# Patient Record
Sex: FEMALE | Race: WHITE
[De-identification: names, ages, dates, MRNs, and addresses within clinical notes are randomized per-mention and may not be internally consistent; named-entity substitution may affect disease eponyms.]

---

## 2019-11-13 ENCOUNTER — HOSPITAL ENCOUNTER (EMERGENCY)
Dept: HOSPITAL 65 - ER | Age: 64
LOS: 1 days | Discharge: HOME | End: 2019-11-14
Payer: COMMERCIAL

## 2019-11-13 VITALS
WEIGHT: 180 LBS | HEIGHT: 63 IN | SYSTOLIC BLOOD PRESSURE: 136 MMHG | BODY MASS INDEX: 31.89 KG/M2 | DIASTOLIC BLOOD PRESSURE: 73 MMHG

## 2019-11-13 VITALS — DIASTOLIC BLOOD PRESSURE: 67 MMHG | SYSTOLIC BLOOD PRESSURE: 134 MMHG

## 2019-11-13 DIAGNOSIS — Z79.899: ICD-10-CM

## 2019-11-13 DIAGNOSIS — E78.00: ICD-10-CM

## 2019-11-13 DIAGNOSIS — Z79.82: ICD-10-CM

## 2019-11-13 DIAGNOSIS — Z88.5: ICD-10-CM

## 2019-11-13 DIAGNOSIS — N39.0: ICD-10-CM

## 2019-11-13 DIAGNOSIS — I25.10: ICD-10-CM

## 2019-11-13 DIAGNOSIS — E11.65: Primary | ICD-10-CM

## 2019-11-13 DIAGNOSIS — Z88.6: ICD-10-CM

## 2019-11-13 DIAGNOSIS — E86.0: ICD-10-CM

## 2019-11-13 DIAGNOSIS — Z79.4: ICD-10-CM

## 2019-11-13 DIAGNOSIS — Z90.49: ICD-10-CM

## 2019-11-13 LAB
ALP INTEST CFR SERPL: 108 U/L (ref 50–136)
ALT SERPL-CCNC: 20 U/L (ref 12–78)
APPEARANCE UR: (no result)
AST SERPL-CCNC: 11 U/L (ref 0–35)
BASOPHILS # BLD AUTO: 0 10^3/UL (ref 0–0.1)
BASOPHILS NFR BLD AUTO: 0.5 % (ref 0–0.2)
BILIRUB UR STRIP.AUTO-MCNC: NEGATIVE MG/DL
CALCIUM SERPL-MCNC: 9.1 MG/DL (ref 8.4–10.5)
CO2 BLDA-SCNC: 20.1 MMOL/L (ref 20–32)
COLOR UR: YELLOW
EOSINOPHIL # BLD AUTO: 0.2 10^3/UL (ref 0–0.2)
EOSINOPHIL NFR BLD AUTO: 2.2 % (ref 0–5)
ERYTHROCYTE [DISTWIDTH] IN BLOOD BY AUTOMATED COUNT: 12.8 % (ref 11.5–14.5)
GLUCOSE PRE 100 G GLC PO SERPL-MCNC: 428 MG/DL (ref 70–110)
HGB BLD-MCNC: 12.8 G/DL (ref 12–15)
LYMPHOCYTES # BLD AUTO: 3.5 10^3/UL (ref 1–4.8)
LYMPHOCYTES NFR BLD AUTO: 42.4 % (ref 24–44)
MCH RBC QN AUTO: 28.6 PG (ref 26–34)
MCHC RBC AUTO-ENTMCNC: 34.8 G/DL (ref 33–37)
MCV RBC AUTO: 82.3 FL (ref 78–100)
MONOCYTES # BLD AUTO: 0.7 10^3/UL (ref 0.3–0.8)
MONOCYTES NFR BLD AUTO: 7.8 % (ref 5–12)
NEUTROPHILS # BLD AUTO: 3.9 10^3/UL (ref 1.8–7.7)
NEUTROPHILS NFR BLD AUTO: 46.9 % (ref 41–85)
PLATELET # BLD AUTO: 252 10^3/UL (ref 150–400)
PMV BLD AUTO: 9.4 FL (ref 7.8–11)
UROBILINOGEN UR QL STRIP.AUTO: NORMAL
WBC # BLD AUTO: 8.3 10^3/UL (ref 4.5–11)
YEAST URNS QL MICRO: (no result)

## 2019-11-13 PROCEDURE — 87086 URINE CULTURE/COLONY COUNT: CPT

## 2019-11-13 PROCEDURE — 96372 THER/PROPH/DIAG INJ SC/IM: CPT

## 2019-11-13 PROCEDURE — 82553 CREATINE MB FRACTION: CPT

## 2019-11-13 PROCEDURE — 82550 ASSAY OF CK (CPK): CPT

## 2019-11-13 PROCEDURE — 80053 COMPREHEN METABOLIC PANEL: CPT

## 2019-11-13 PROCEDURE — 84484 ASSAY OF TROPONIN QUANT: CPT

## 2019-11-13 PROCEDURE — 85025 COMPLETE CBC W/AUTO DIFF WBC: CPT

## 2019-11-13 PROCEDURE — 96375 TX/PRO/DX INJ NEW DRUG ADDON: CPT

## 2019-11-13 PROCEDURE — 87186 SC STD MICRODIL/AGAR DIL: CPT

## 2019-11-13 PROCEDURE — 36415 COLL VENOUS BLD VENIPUNCTURE: CPT

## 2019-11-13 PROCEDURE — 96365 THER/PROPH/DIAG IV INF INIT: CPT

## 2019-11-13 PROCEDURE — 96361 HYDRATE IV INFUSION ADD-ON: CPT

## 2019-11-13 PROCEDURE — 87077 CULTURE AEROBIC IDENTIFY: CPT

## 2019-11-13 PROCEDURE — 71046 X-RAY EXAM CHEST 2 VIEWS: CPT

## 2019-11-13 PROCEDURE — 99285 EMERGENCY DEPT VISIT HI MDM: CPT

## 2019-11-13 PROCEDURE — 93005 ELECTROCARDIOGRAM TRACING: CPT

## 2019-11-13 PROCEDURE — 82948 REAGENT STRIP/BLOOD GLUCOSE: CPT

## 2019-11-13 PROCEDURE — 83690 ASSAY OF LIPASE: CPT

## 2019-11-13 PROCEDURE — 81000 URINALYSIS NONAUTO W/SCOPE: CPT

## 2019-11-13 NOTE — NUR
ARRIVAL



PATIENT PRESENTS VIA AMBULANCE WITH COMPLAINTS OF GENERALIZED BODY ACHES, 
BILATERAL SHOULDER PAIN, SOB WHEN LYING DOWN AND DIARRHEA FOR THE PAST 2 DAYS. 
PATIENT ALSO REPORTS THAT SHE THINKS SHE HAS A UTI AND IS DEHYDRATED. PATIENT 
HAS A CALVILLO CATHETER IN PLACE THAT SHE HAD REPLACED ONE WEEK AGO. REPORTS SHE 
WAS RECENTLY DISCHARGED FROM THE NURSING HOME 10/30/19. EMS REPORT BS 456MG/DL. 
NO SIGNS OF DISTRESS NOTED. INDER LEBRON MD NOTIFIED.

## 2019-11-13 NOTE — ER.PDOC
General


Chief Complaint:  Requesting Medical Care


Stated Complaint:  GENERALIZED PAIN,HYPERGLYCEMIA


TRAVEL OUT OF US:  No


Time seen by MD:  22:50


Source:  patient, family, EMS


Exam Limitations:  no limitations





History of Present Illness


Initial Comments


63 Y/O F WITH HX TO ED VIA EMS WITH MULTIPLE COMPLAINTS, BLOOD SUGAR UP, POSS 

UTI WITH CHRONIC CALVILLO THAT WAS CHANGED OUT X 1 WEEK, UPPER BILAT SHOULDER PAIN 

WITH DEEP BREATH-- NOT LIKE PAST HEART PAIN AND DIARRHEA X 2 DAYS. NO CHEST 

PAIN, NO FEVER, NO AND PAIN, NO N/V, NO BLOOD IN STOOLS, NO COUGH, STATES FEELS 

DEHYDRATED, NO OTHER COMPLAINTS. PATIENT REQUESTS TORADOL--CAN TAKE, DOES NOT 

HAVE AN ALLERGY TO IBP.


Timing/Duration:  24 hours


Severity:  moderate


Associated Symptoms:  weakness


Allergies:  


Coded Allergies:  


     codeine (Verified  Allergy, Unknown, 8/6/16)


     ibuprofen (Verified  Allergy, Unknown, 12/9/16)


     morphine (Verified  Allergy, Unknown, 8/6/16)


     naproxen (Verified  Allergy, Unknown, 8/6/16)


Home Meds


Active Scripts


Nitroglycerin (NITROGLYCERIN) 0.4 Mg Tab.subl, 0.4 MG SL DAILY PRN for CHEST 

PAIN, #30


   Prov:REINALDO REEVES MD         8/31/16


Metoprolol Tartrate 25MG (LOPRESSER 25MG) 25 Mg Tablet, 50 MG PO DAILY for BLOOD

PRESSURE, #30 TAB 4 Refills


   Prov:REINALDO REEVES MD         8/31/16


Clopidogrel Bisulfate (PLAVIX) 75 Mg Tablet, 1 TAB PO DAILY, #30 TAB 4 Refills


   Prov:REINALDO REEVES MD         8/31/16


Aspirin (ASPIR 81) 81 Mg Tablet.dr, 162 MG PO DAILY, #60 4 Refills


   Prov:REINALDO REEVES MD         8/31/16


Metoprolol Tartrate 25MG (LOPRESSER 25MG) 25 Mg Tablet, 50 MG PO BID for 

HYPERTENSION, #60 TAB 4 Refills


   Prov:REINALDO REEVES MD         8/10/16


Clopidogrel Bisulfate (PLAVIX) 75 Mg Tablet, 1 TAB PO DAILY, #30 TAB 4 Refills


   Prov:REINALDO REEVES MD         8/10/16


Reported Medications


Tramadol Hcl (TRAMADOL HCL) 50 Mg Tablet, 1 TAB PO TID for PAIN for 5 Days, TAB


   12/10/16


Doxylamine Succinate (SLEEP AID) 25 Mg Tablet, 25 MG PO HS for INSOMNIA, TABLET


   8/28/16


Aspirin (ASPIRIN) 81 Mg Tab.chew, 2 TAB PO DAILY, #30 TAB


   8/28/16


Simvastatin (SIMVASTATIN) 40 Mg Tablet, 1 TAB PO HS, #90 TAB 1 Refill


   8/28/16


Omega-3 Fatty Acids (FISH OIL) 300 Mg Capsule, 300 MG PO BID, CAPSULE


   8/28/16


Docusate Sodium (COLACE) 100 Mg Capsule, 200 MG PO PRN PRN for CONSTIPATION, 

CAPSULE


   8/28/16


Omega-3 Fatty Acids (FISH OIL) 500 Mg Capsule, 500 MG PO BID, CAPSULE


   8/28/16


Lisinopril (LISINOPRIL) 20 Mg Tablet, 25 MG PO DAILY, TABLET


   8/7/16


Simvastatin (SIMVASTATIN) 40 Mg Tablet, 1 TAB PO HS, #30 TAB 5 Refills


   8/7/16


Insulin Regular, Human (NOVOLIN R) 100 Unit/1 Ml Vial, 10 UNITS SQ TID


   INJECT THE NUMBER OF UNITS OF INSULIN AS SPECIFIED BY PROVIDER


   8/7/16





Past Medical History


Medical History:  coronary artery disease, cardiac problems, diabetes, high 

cholesterol, vascular disease


Surgical History:  cardiac cath, angioplasty, cholecystectomy, coronary bypass 

surgery, tubal, other





Family History


Significant Family History:  no pertinent family hx





Social History


Smoking:  non-smoker


Alcohol Use:  none


Drug Use:  none





Reviewed


Nursing Reviewed:  Vital Signs, Abn. Noted, Nursing Assessment





Review of Systems


Constitutional:  chills, malaise


EENTM:  no symptoms reported


Respiratory:  no symptoms reported


Cardiovascular:  no symptoms reported


Gastrointestinal:  diarrhea, other


Genitourinary:  no symptoms reported, other


Musculoskeletal:  no symptoms reported


Skin:  no symptoms reported


Psychiatric/Neurological:  no symptoms reported


Hematologic/Lymphatic:  no symptoms reported


Immunological/Allergic:  no symptoms reported





Physical Exam


General Appearance:  No Apparent Distress, WD/WN


EENT:  eyes nml inspection, nml ENT inspection, pharynx nml


Neck:  Non-Tender, Full Range of Motion, Supple, Normal Inspection


Respiratory:  chest non-tender, lungs clear, normal breath sounds, no 

respiratory distress


CVS:  reg rate & rhythm, no murmur, no gallop, pulses nml, nml capillary refill


Gastrointestinal:  Normal Bowel Sounds, No Organomegaly, No Pulsatile Mass, Non 

Tender


Back:  Normal Inspection, No CVA Tenderness


Extremities:  Normal Range of Motion, Non-Tender, Other


Neurologic/Psychiatric:  CNs II-XII NML as Tested, No Motor/Sensory Deficits, 

Alert, Normal Mood/Affect, Oriented x 3


Skin:  Normal Color, Warm/Dry


Lymphatic:  No Adenopathy


Comments


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  CALVILLO IN [place]. ABD --NON TENDER.





Results/Orders


Results/Orders





Orders - JOYCE LEBRON DO


Cbc With Auto Diff (11/13/19 22:51)


Comprehensive Metabolic Panel (11/13/19 22:51)


Urinalysis (11/13/19 22:51)


Creatine Kinase Mb (11/13/19 22:51)


Creatine Kinase (11/13/19 22:51)


Troponin I (11/13/19 22:51)


Xr Chest 2v (11/13/19 22:51)


Ekg-Routine (11/13/19 22:51)


Lipase (11/13/19 22:51)


0.9 % Sodium Chloride (Ns 1000ml) (11/13/19 22:51)


Saline Lock (11/13/19 22:51)


0.9 % Sodium Chloride (Ns 1000ml) (11/13/19 23:20)


Insulin Regular, Human (Humulin R) (11/14/19 00:00)


Urine Culture (11/13/19 23:25)


Insulin Regular, Human (Humulin R) (11/13/19 23:45)


Ceftriaxone Sodium (Rocephin) (11/14/19 00:00)


0.9 % Sodium Chloride (Ns 100ml) (11/13/19 23:52)


Ceftriaxone Sodium (Rocephin) (11/13/19 23:52)


Place Calvillo Catheter (11/13/19 23:57)


Ketorolac Tromethamine (Toradol) (11/14/19 00:00)


Ketorolac Tromethamine (Toradol) (11/14/19 00:06)


0.9 % Sodium Chloride (Ns 1000ml) (11/14/19 00:23)


0.9 % Sodium Chloride (Ns 1000ml) (11/14/19 00:25)


Blood Glucose Assessment (11/14/19 00:33)





Vital Signs








  Date Time  Temp Pulse Resp B/P (MAP) Pulse Ox O2 Delivery O2 Flow Rate FiO2


 


11/13/19 22:15 97.8 82 18     


 


11/13/19 22:15 97.8 82 18 136/73 (94) 99 Room Air  


 


11/13/19 22:15 97.8 82 18  99 Room Air  








Administered Medications








 Medications


  (Trade)  Dose


 Ordered  Sig/Jack


 Route


 PRN Reason  Start Time


 Stop Time Status Last Admin


Dose Admin


 


 Ceftriaxone


 Sodium 1 gm/


 Sodium Chloride  100 ml @ 


 100 mls/hr  STAT


 IV


   11/14/19 00:00


 12/14/19 00:00  11/13/19 23:59


100 MLS/HR


 


 Insulin Human


 Regular


  (Humulin R)  10 unit  OT  ONCE


 SQ


   11/14/19 00:00


 11/14/19 00:01 DC 11/13/19 23:59


10 UNIT


 


 Ketorolac


 Tromethamine


  (Toradol)  30 mg  STAT  PRN


 IV


 PAIN 4 - 6  11/14/19 00:00


 12/14/19 00:00  11/14/19 00:21


30 MG


 


 Sodium Chloride  1,000 ml @ 


 0 mls/hr  Q0M STAT


 IV


   11/13/19 22:51


 11/13/19 22:56 DC 11/13/19 23:24


1,000 MLS/HR


 


 Sodium Chloride  1,000 ml @ 


 0 mls/hr  Q0M STAT


 IV


   11/14/19 00:23


 11/14/19 00:25 DC 11/14/19 00:29


1,000 MLS/HR








                                Laboratory Tests








Test


 11/13/19


23:05 11/13/19


23:25


 


White Blood Count


 8.3 10^3/uL


(4.5-11.0) 





 


Red Blood Count


 4.47 10^6/uL


(4.00-5.20) 





 


Hemoglobin


 12.8 g/dL


(12.0-15.0) 





 


Hematocrit


 36.8 %


(36.0-46.0) 





 


Mean Corpuscular Volume


 82.3 fL


() 





 


Mean Corpuscular Hemoglobin


 28.6 pg


(26-34) 





 


Mean Corpuscular Hemoglobin


Concent 34.8 g/dL


(33-37) 





 


Red Cell Distribution Width


 12.8 %


(11.5-14.5) 





 


Platelet Count


 252 10^3/uL


(150-400) 





 


Mean Platelet Volume


 9.4 fL


(7.8-11.0) 





 


Neutrophils (%) (Auto)


 46.9 %


(41.0-85.0) 





 


Lymphocytes (%) (Auto)


 42.4 %


(24.0-44.0) 





 


Monocytes (%) (Auto)


 7.8 %


(5.0-12.0) 





 


Neutrophils # (Auto)


 3.9 10^3/uL


(1.8-7.7) 





 


Lymphocytes # (Auto)


 3.5 10^3/uL


(1.0-4.8) 





 


Monocytes # (Auto)


 0.7 10^3/uL


(0.3-0.8) 





 


Absolute Immature Granulocyte


(auto 0.02 10^3 u/L


(0-2) 





 


Immature Granulocytes %


 0.20 %


(0.00-0.50) 





 


Eosinophils %


 2.2 %


(0.0-5.0) 





 


Basophils %


 0.5 %


(0.0-0.2)  H 





 


Basophils #


 0.0 10^3/uL


(0.0-0.1) 





 


Eosinophil Count


 0.2 10^3/uL


(0.0-0.2) 





 


Sodium Level


 133 mmol/L


(132-145) 





 


Potassium Level


 3.9 mmol/L


(3.6-5.2) 





 


Chloride Level


 99.0 mmol/L


() 





 


Carbon Dioxide Level


 20.1 mmol/L


(20.0-32) 





 


Anion Gap 17.8   


 


Blood Urea Nitrogen


 19 mg/dL


(7-18)  H 





 


Creatinine


 1.19 mg/dL


(0.59-1.40) 





 


Estimated GFR (


American) 55.6 (>/=60)  


 





 


BUN/Creatinine Ratio 15.0   


 


Glucose Level


 428 mg/dL


()  *H 





 


Calcium Level


 9.1 mg/dL


(8.4-10.5) 





 


Total Bilirubin


 0.6 mg/dL


(0.2-1.0) 





 


Aspartate Amino Transferase


(AST) 11 U/L (0-35)  


 





 


Alanine Aminotransferase (ALT)


 20 U/L (12-78)


 





 


Alkaline Phosphatase


 108 U/L


() 





 


Total Creatine Kinase


 79 U/L


() 





 


Creatine Kinase MB


 1.5 ng/mL


(0.5-3.6) 





 


Troponin I


 < 0.02 ng/mL


(0.00-0.05) 





 


Total Protein


 6.7 g/dL


(6.4-8.2) 





 


Albumin


 3.0 g/dL


(3.4-5.0)  L 





 


Globulin 3.7   


 


Lipase


 102 U/L


(114-286)  L 





 


Urine Collection Type  VOID  


 


Urine Color


 


 YELLOW


(YELLOW)


 


Urine Appearance


 


 CLOUDY (CLEAR)


H


 


Urine Bilirubin


 


 NEGATIVE MG/DL


(NEGATIVE)


 


Urine Ketones


 


 NEGATIVE


(NEGATIVE)


 


Urine Specific Gravity


 


 1.010


(1.005-1.035)


 


Urine pH  6 (5.0-6.0)  


 


Urine Protein


 


 15 mg/dL


(NEGATIVE)  H


 


Urine Urobilinogen


 


 NORMAL


(NEGATIVE)


 


Urine Nitrate


 


 NEGATIVE


(NEGATIVE)


 


Urine Leukocyte Esterase


 


 100/ul  1+


(NEGATIVE)


 


Urine Blood


 


 150 3+


(NEGATIVE)  H


 


Urine RBC


 


 10-25 RBC/HPF


(NONE SEEN)  H


 


Urine WBC


 


 TNTC WBC/HPF


(0-2)  H


 


Urine Squamous Epithelial


Cells 


 MANY #/HPF


(FEW)


 


Urine Bacteria


 


 FEW (NONE


SEEN)  H


 


Urine Yeast  MANY  


 


Urine Glucose


 


 1000


(NEGATIVE)  H











Progress


Progress


AT 0000 FEELS MUCH BETTER, NO DIARRHEA SINCE THIS AM, NO ABD PAIN UNLESS PALP 

LOWER MID ABD, NO RLQ PAIN, PATIENT HAS NO OTHER COMPLAINTS. DIFF DX IN DETAIL, 

NEED TO DRINK MORE WATER, FOLLOW DIABETES MORE CLOSELY, AND FOLLOW UP WITH YOUR 

DR. PATIENT WANTS TO GO HOME. WILL REPLACE CALVILLO. 0045- WANTS TO GO HOME, NO 

COMPLAINTS, WILL CHECK ANOTHER BLOOD GLUCOSE.





EKG/XRAY/CT/US


EKG:  NSR


EKG Comments:  EKG--RATE--74, NO ACUTE CHANGES.


XRAY:  chest


XRAY Comments:  CHEST X-RAY---NEG STUDY.





Course


Duration or Total Time Spent w:  60 mins


Vitals & review Data





Vital Sign - Last 24 Hours








 11/13/19 11/13/19 11/13/19





 22:15 22:15 22:15


 


Temp 97.8 97.8 97.8


 


Pulse 82 82 82


 


Resp 18 18 18


 


B/P (MAP)  136/73 (94) 


 


Pulse Ox 99 99 


 


O2 Delivery Room Air Room Air 








Laboratory Tests








Test


 11/13/19


23:05 11/13/19


23:25


 


White Blood Count 8.3 10^3/uL  


 


Red Blood Count 4.47 10^6/uL  


 


Hemoglobin 12.8 g/dL  


 


Hematocrit 36.8 %  


 


Mean Corpuscular Volume 82.3 fL  


 


Mean Corpuscular Hemoglobin 28.6 pg  


 


Mean Corpuscular Hemoglobin


Concent 34.8 g/dL 


 





 


Red Cell Distribution Width 12.8 %  


 


Platelet Count 252 10^3/uL  


 


Mean Platelet Volume 9.4 fL  


 


Neutrophils (%) (Auto) 46.9 %  


 


Lymphocytes (%) (Auto) 42.4 %  


 


Monocytes (%) (Auto) 7.8 %  


 


Neutrophils # (Auto) 3.9 10^3/uL  


 


Lymphocytes # (Auto) 3.5 10^3/uL  


 


Monocytes # (Auto) 0.7 10^3/uL  


 


Absolute Immature Granulocyte


(auto 0.02 10^3 u/L 


 





 


Immature Granulocytes % 0.20 %  


 


Eosinophils % 2.2 %  


 


Basophils % 0.5 %  


 


Basophils # 0.0 10^3/uL  


 


Eosinophil Count 0.2 10^3/uL  


 


Sodium Level 133 mmol/L  


 


Potassium Level 3.9 mmol/L  


 


Chloride Level 99.0 mmol/L  


 


Carbon Dioxide Level 20.1 mmol/L  


 


Anion Gap 17.8  


 


Blood Urea Nitrogen 19 mg/dL  


 


Creatinine 1.19 mg/dL  


 


Estimated GFR (


American) 55.6 


 





 


BUN/Creatinine Ratio 15.0  


 


Glucose Level 428 mg/dL  


 


Calcium Level 9.1 mg/dL  


 


Total Bilirubin 0.6 mg/dL  


 


Aspartate Amino Transf


(AST/SGOT) 11 U/L 


 





 


Alanine Aminotransferase


(ALT/SGPT) 20 U/L 


 





 


Alkaline Phosphatase 108 U/L  


 


Total Creatine Kinase 79 U/L  


 


Creatine Kinase MB 1.5 ng/mL  


 


Troponin I < 0.02 ng/mL  


 


Total Protein 6.7 g/dL  


 


Albumin 3.0 g/dL  


 


Globulin 3.7  


 


Lipase 102 U/L  


 


Urine Collection Type  VOID 


 


Urine Color  YELLOW 


 


Urine Appearance  CLOUDY 


 


Urine Bilirubin  NEGATIVE MG/DL 


 


Urine Ketones  NEGATIVE 


 


Urine Specific Gravity  1.010 


 


Urine pH  6 


 


Urine Protein  15 mg/dL 


 


Urine Urobilinogen  NORMAL 


 


Urine Nitrate  NEGATIVE 


 


Urine Leukocyte Esterase  100/ul  1+ 


 


Urine Blood  150 3+ 


 


Urine RBC  10-25 RBC/HPF 


 


Urine WBC  TNTC WBC/HPF 


 


Urine Squamous Epithelial


Cells 


 MANY #/HPF 





 


Urine Bacteria  FEW 


 


Urine Yeast  MANY 


 


Urine Glucose  1000 








                               Current Medications








 Medications


  (Trade)  Dose


 Ordered  Sig/Jack


 PRN Reason  Start Time


 Stop Time Status Last Admin


 


 Ceftriaxone


 Sodium 1 gm/


 Sodium Chloride  100 ml @ 


 100 mls/hr  STAT


   11/14/19 00:00


 12/14/19 00:00  11/13/19 23:59





 


 Ketorolac


 Tromethamine


  (Toradol)  30 mg  STAT  PRN


 PAIN 4 - 6  11/14/19 00:00


 12/14/19 00:00  11/14/19 00:21











                               Current Medications








 Medications


  (Trade)  Dose


 Ordered  Sig/Jack


 PRN Reason  Start Time


 Stop Time Status Last Admin


 


 Ceftriaxone


 Sodium 1 gm/


 Sodium Chloride  100 ml @ 


 100 mls/hr  STAT


   11/14/19 00:00


 12/14/19 00:00   











Sepsis Infection Criteria Pres:  None





Departure


Time of Disposition:  00:42


Disposition:  01 HOME, SELF-CARE


Impression:  


   Primary Impression:  


   Diabetes mellitus


   Additional Impressions:  


   UTI (urinary tract infection)


   Dehydration


Condition:  Stable


Patient Instructions:  Diabetes Meal Planning Guide, Indwelling Urinary Catheter

Care-Brief, Pregnancy - Urinary Tract Infection


Referrals:  


ROMEO AVERY (PCP)


PRIMARY CARE PROVIDER





Additional Instructions:  


TO ED IF WORSE OR NO BETTER, DRINK MORE WATER, FOLLOW BLOOD SUGARS MORE CLOSELY,

FOLLOW UP WITH YOUR DR AND FOLLOW UP WITH URINE CULTURE RESULTS. RX CIPRO 500MG 

1 BID X 10 DAYS.


Duration or Time Spent with Pa:  25 MIN





Problem Qualifiers











JOYCE LEBRON DO             Nov 13, 2019 22:50

## 2019-11-13 NOTE — PCM.EKG
[facility]

                                       

Test Date:    2019               Test Time:    23:01:02

Pat Name:     BIRDIE ROMERO             Department:   

Patient ID:   [id number]          Room:          

Gender:       F                        Technician:   JOHN

:          1955               Requested By: JOYCE LEBRON

Order Number: [id number]           Reading MD:     

                                 Measurements

Intervals                              Axis          

Rate:         74                       P:            17

IN:           137                      QRS:          -38

QRSD:         108                      T:            -43

QT:           408                                    

QTc:          453                                    

                           Interpretive Statements

Sinus rhythm

Incomplete left bundle branch block

Left ventricular hypertrophy

Baseline wander in lead(s) I,II,aVR

Compared to ECG 2019 16:30:37

Left bundle-branch block now present

Left ventricular hypertrophy now present

Left-axis deviation no longer present

ST (T wave) deviation no longer present



Please click the below link to view image of tracing.

## 2019-11-14 VITALS — SYSTOLIC BLOOD PRESSURE: 128 MMHG | DIASTOLIC BLOOD PRESSURE: 72 MMHG

## 2019-11-14 VITALS — SYSTOLIC BLOOD PRESSURE: 136 MMHG | DIASTOLIC BLOOD PRESSURE: 66 MMHG

## 2019-11-14 NOTE — NUR
CATHETER



CALVILLO CATHETER REMOVED AND REPLACED WITH NEW CALVILLO PER REQUEST OF MD BERNARDINO. 
PATIENT TOLERATED WELL.

## 2019-11-14 NOTE — DIREP
PROCEDURE:CHEST 2 VIEWS

 

COMPARISON:[facility], CR, XRAY CHEST 2 VWS, 12/03/2016, 02:08 

PM.

 

INDICATIONS:UPPER BILAT SHOULDER PAIN WITH DEEP BREATH

 

FINDINGS:

LUNGS/PLEURA:Slightly decreased lung volumes.  Mild crowding of the 

bronchovascular structures.  No focal consolidation.  No pneumothorax or 

pleural effusion.

VASCULATURE:Normal.  Unremarkable pulmonary vasculature.

CARDIAC:Normal heart size.  Post CABG changes with median sternotomy wires.  

MEDIASTINUM:Normal.  No visible mass or adenopathy. 

BONES:Mild degenerative disc disease and spondylosis without visible acute 

abnormalities.  

OTHER:Negative.  

 

CONCLUSION:Low lung volumes.  No focal airspace consolidation.  No 

pneumothorax or pleural effusion.  

 

 

Dictated by: Geronimo Reddy MD on 11/14/2019 at 00:03 AM     

Electronically Signed By: Geronimo Reddy MD on 11/14/2019 at 00:04 AM

## 2019-11-15 ENCOUNTER — HOSPITAL ENCOUNTER (OUTPATIENT)
Dept: HOSPITAL 65 - NPLAB | Age: 64
Discharge: HOME | End: 2019-11-15
Attending: NURSE PRACTITIONER
Payer: COMMERCIAL

## 2019-11-15 DIAGNOSIS — E11.65: Primary | ICD-10-CM

## 2019-11-15 LAB
ALP INTEST CFR SERPL: 102 U/L (ref 50–136)
ALT SERPL-CCNC: 25 U/L (ref 12–78)
AST SERPL-CCNC: 20 U/L (ref 0–35)
BASOPHILS # BLD AUTO: 0 10^3/UL (ref 0–0.1)
BASOPHILS NFR BLD AUTO: 0.4 % (ref 0–0.2)
CALCIUM SERPL-MCNC: 9.7 MG/DL (ref 8.4–10.5)
CHOLEST SERPL-MCNC: 179 MG/DL (ref 120–240)
CO2 BLDA-SCNC: 23.7 MMOL/L (ref 20–32)
EOSINOPHIL # BLD AUTO: 0.2 10^3/UL (ref 0–0.2)
EOSINOPHIL NFR BLD AUTO: 2.2 % (ref 0–5)
ERYTHROCYTE [DISTWIDTH] IN BLOOD BY AUTOMATED COUNT: 13.1 % (ref 11.5–14.5)
GLUCOSE PRE 100 G GLC PO SERPL-MCNC: 267 MG/DL (ref 70–110)
HDLC SERPL-MCNC: 33 MG/DL (ref 32–96)
HGB BLD-MCNC: 13.3 G/DL (ref 12–15)
LYMPHOCYTES # BLD AUTO: 2.5 10^3/UL (ref 1–4.8)
LYMPHOCYTES NFR BLD AUTO: 33.6 % (ref 24–44)
MCH RBC QN AUTO: 28.9 PG (ref 26–34)
MCHC RBC AUTO-ENTMCNC: 34.2 G/DL (ref 33–37)
MCV RBC AUTO: 84.6 FL (ref 78–100)
MONOCYTES # BLD AUTO: 0.6 10^3/UL (ref 0.3–0.8)
MONOCYTES NFR BLD AUTO: 7.7 % (ref 5–12)
NEUTROPHILS # BLD AUTO: 4.1 10^3/UL (ref 1.8–7.7)
NEUTROPHILS NFR BLD AUTO: 56 % (ref 41–85)
PLATELET # BLD AUTO: 271 10^3/UL (ref 150–400)
PMV BLD AUTO: 9.6 FL (ref 7.8–11)
WBC # BLD AUTO: 7.3 10^3/UL (ref 4.5–11)

## 2019-11-15 PROCEDURE — 80053 COMPREHEN METABOLIC PANEL: CPT

## 2019-11-15 PROCEDURE — 85025 COMPLETE CBC W/AUTO DIFF WBC: CPT

## 2019-11-15 PROCEDURE — 84443 ASSAY THYROID STIM HORMONE: CPT

## 2019-11-15 PROCEDURE — 80061 LIPID PANEL: CPT

## 2019-11-15 PROCEDURE — 84439 ASSAY OF FREE THYROXINE: CPT

## 2019-11-27 ENCOUNTER — HOSPITAL ENCOUNTER (OUTPATIENT)
Dept: HOSPITAL 65 - NPLAB | Age: 64
Discharge: HOME | End: 2019-11-27
Attending: NURSE PRACTITIONER
Payer: COMMERCIAL

## 2019-11-27 DIAGNOSIS — E11.65: Primary | ICD-10-CM

## 2019-11-27 PROCEDURE — 83036 HEMOGLOBIN GLYCOSYLATED A1C: CPT

## 2020-01-11 ENCOUNTER — HOSPITAL ENCOUNTER (EMERGENCY)
Dept: HOSPITAL 65 - ER | Age: 65
Discharge: ANOTHER HOSPITAL | End: 2020-01-11
Payer: COMMERCIAL

## 2020-01-11 VITALS — SYSTOLIC BLOOD PRESSURE: 119 MMHG | DIASTOLIC BLOOD PRESSURE: 41 MMHG

## 2020-01-11 VITALS — SYSTOLIC BLOOD PRESSURE: 133 MMHG | DIASTOLIC BLOOD PRESSURE: 57 MMHG

## 2020-01-11 VITALS — HEIGHT: 63 IN | WEIGHT: 180 LBS | BODY MASS INDEX: 31.89 KG/M2

## 2020-01-11 VITALS — SYSTOLIC BLOOD PRESSURE: 122 MMHG | DIASTOLIC BLOOD PRESSURE: 72 MMHG

## 2020-01-11 VITALS — SYSTOLIC BLOOD PRESSURE: 145 MMHG | DIASTOLIC BLOOD PRESSURE: 66 MMHG

## 2020-01-11 DIAGNOSIS — Z90.49: ICD-10-CM

## 2020-01-11 DIAGNOSIS — E87.70: ICD-10-CM

## 2020-01-11 DIAGNOSIS — Z79.4: ICD-10-CM

## 2020-01-11 DIAGNOSIS — I25.10: ICD-10-CM

## 2020-01-11 DIAGNOSIS — Z88.5: ICD-10-CM

## 2020-01-11 DIAGNOSIS — Z79.82: ICD-10-CM

## 2020-01-11 DIAGNOSIS — E78.00: ICD-10-CM

## 2020-01-11 DIAGNOSIS — I50.9: Primary | ICD-10-CM

## 2020-01-11 DIAGNOSIS — E11.9: ICD-10-CM

## 2020-01-11 DIAGNOSIS — Z79.899: ICD-10-CM

## 2020-01-11 DIAGNOSIS — Z88.6: ICD-10-CM

## 2020-01-11 LAB
ALP INTEST CFR SERPL: 83 U/L (ref 50–136)
ALT SERPL-CCNC: 18 U/L (ref 12–78)
AST SERPL-CCNC: 12 U/L (ref 0–35)
BASOPHILS # BLD AUTO: 0 10^3/UL (ref 0–0.1)
BASOPHILS NFR BLD AUTO: 0.4 % (ref 0–0.2)
CALCIUM SERPL-MCNC: 8.7 MG/DL (ref 8.4–10.5)
CO2 BLDA-SCNC: 27.2 MMOL/L (ref 20–32)
EOSINOPHIL # BLD AUTO: 0.2 10^3/UL (ref 0–0.2)
EOSINOPHIL NFR BLD AUTO: 2.5 % (ref 0–5)
ERYTHROCYTE [DISTWIDTH] IN BLOOD BY AUTOMATED COUNT: 14.6 % (ref 11.5–14.5)
GLUCOSE PRE 100 G GLC PO SERPL-MCNC: 333 MG/DL (ref 70–110)
LYMPHOCYTES # BLD AUTO: 2.5 10^3/UL (ref 1–4.8)
LYMPHOCYTES NFR BLD AUTO: 32.3 % (ref 24–44)
MCH RBC QN AUTO: 28.9 PG (ref 26–34)
MONOCYTES # BLD AUTO: 0.7 10^3/UL (ref 0.3–0.8)
MONOCYTES NFR BLD AUTO: 8.5 % (ref 5–12)
NEUTROPHILS # BLD AUTO: 4.3 10^3/UL (ref 1.8–7.7)
NEUTROPHILS NFR BLD AUTO: 55.9 % (ref 41–85)
PLATELET # BLD AUTO: 276 10^3/UL (ref 150–400)

## 2020-01-11 PROCEDURE — 83880 ASSAY OF NATRIURETIC PEPTIDE: CPT

## 2020-01-11 PROCEDURE — 96374 THER/PROPH/DIAG INJ IV PUSH: CPT

## 2020-01-11 PROCEDURE — 85025 COMPLETE CBC W/AUTO DIFF WBC: CPT

## 2020-01-11 PROCEDURE — 80053 COMPREHEN METABOLIC PANEL: CPT

## 2020-01-11 PROCEDURE — 36415 COLL VENOUS BLD VENIPUNCTURE: CPT

## 2020-01-11 PROCEDURE — 93005 ELECTROCARDIOGRAM TRACING: CPT

## 2020-01-11 PROCEDURE — 83605 ASSAY OF LACTIC ACID: CPT

## 2020-01-11 PROCEDURE — 71045 X-RAY EXAM CHEST 1 VIEW: CPT

## 2020-01-11 PROCEDURE — 85610 PROTHROMBIN TIME: CPT

## 2020-01-11 PROCEDURE — 87804 INFLUENZA ASSAY W/OPTIC: CPT

## 2020-01-11 PROCEDURE — 82550 ASSAY OF CK (CPK): CPT

## 2020-01-11 PROCEDURE — 71275 CT ANGIOGRAPHY CHEST: CPT

## 2020-01-11 PROCEDURE — 87040 BLOOD CULTURE FOR BACTERIA: CPT

## 2020-01-11 PROCEDURE — 84484 ASSAY OF TROPONIN QUANT: CPT

## 2020-01-11 PROCEDURE — 99285 EMERGENCY DEPT VISIT HI MDM: CPT

## 2020-01-11 PROCEDURE — 85730 THROMBOPLASTIN TIME PARTIAL: CPT

## 2020-01-11 PROCEDURE — 85379 FIBRIN DEGRADATION QUANT: CPT

## 2020-01-11 NOTE — DIREP
PROCEDURE:CHEST 1 VIEW

 

COMPARISON:[facility], CR, XRAY CHEST 2 VWS, 11/13/2019, 11:13 

PM.

 

INDICATIONS:SOB

 

FINDINGS:

LUNGS/PLEURA: Interstitial changes.  Generalize haziness.  No evidence of 

pleural effusion.

VASCULATURE: Indistinct.  

CARDIAC: No cardiomegaly.  

ELEANOR/MEDIASTINUM:No visible mass or adenopathy.  CABG.

BONES: No acute changes.

OTHER: No additional findings.

 

CONCLUSION:

1.  Interstitial changes.  Generalize haziness.  No focal consolidation.

 

Dictated by: Inocencio Jolly M.D.  On 01/11/2020 at 02:53 AM     

Electronically Signed By: Inocencio Jolly M.D. on 01/11/2020 at 02:56 AM

## 2020-01-11 NOTE — PCM.EKG
[facility]

                                       

Test Date:    2020               Test Time:    02:55:05

Pat Name:     BIRDIE ROMERO             Department:   

Patient ID:   [id number]          Room:          

Gender:       F                        Technician:   JOHN

:          1955               Requested By: MARU CANTU

Order Number: [id number]           Reading MD:   Maru CANTU

                                 Measurements

Intervals                              Axis          

Rate:         63                       P:            17

RI:           118                      QRS:          -28

QRSD:         110                      T:            41

QT:           474                                    

QTc:          486                                    

                           Interpretive Statements

Sinus rhythm

Borderline short RI interval

Borderline left axis deviation

Probable anteroseptal infarct, old

Compared to ECG 2019 23:01:02

Myocardial infarct finding now present

Left bundle-branch block no longer present

Left ventricular hypertrophy no longer present



Electronically Signed On 2020 23:06:41 CST by Maru CANTU



Please click the below link to view image of tracing.

## 2020-01-11 NOTE — DIREP
PROCEDURE:CTA CHEST

 

COMPARISON:None.

 

INDICATIONS:SOB

 

TECHNIQUE:Following the intravenous administration of contrast material, 

axial cuts were obtained through the chest.  Multiplanar / 3-D - MIP 

reconstructions are provided.  

 

FINDINGS:

PULMONARY ARTERIES: Patent.

LUNGS/PLEURA: Motion artifact.  Suggestion of interstitial infiltrates.  Small 

bilateral pleural effusions.

VASCULATURE: Unremarkable pulmonary vasculature.

CARDIAC: No cardiac abnormality or cardiomegaly.

THORACIC AORTA:  Negative.

MEDIASTINUM/ELEANOR:  Negative.

CHEST WALL:  Negative.

LIMITED ABDOMEN: Negative.

BONES:  Negative.

OTHER:  No additional findings.

 

CONCLUSION:

1.  No evidence of pulmonary embolic disease.

2.  The appearance raises question of congestive heart failure or volume 

overload.

 

Dictated by: Inocencio Jolly M.D.  On 01/11/2020 at 05:09 AM     

Electronically Signed By: Inocencio Jolly M.D. on 01/11/2020 at 05:14 AM

## 2020-01-11 NOTE — ER.PDOC
General


Chief Complaint:  Requesting Medical Care


Stated Complaint:  SOB


Time seen by MD:  02:44


Source:  patient


Exam Limitations:  no limitations





History of Present Illness


Initial Comments


SOB worse when laying flat


Timing/Duration:  1-3 hours


Severity:  moderate


Activities at Onset:  rest


Prior Episodes/Possible Cause:  no prior episodes


Associated Symptoms:  edema


Allergies:  


Coded Allergies:  


     codeine (Verified  Allergy, Unknown, 8/6/16)


     ibuprofen (Verified  Allergy, Unknown, 12/9/16)


     morphine (Verified  Allergy, Unknown, 8/6/16)


     naproxen (Verified  Allergy, Unknown, 8/6/16)


Home Meds


Active Scripts


Nitroglycerin (NITROGLYCERIN) 0.4 Mg Tab.subl, 0.4 MG SL DAILY PRN for CHEST 

PAIN, #30


   Prov:REINALDO REEVES MD         8/31/16


Metoprolol Tartrate 25MG (LOPRESSER 25MG) 25 Mg Tablet, 50 MG PO DAILY for BLOOD

PRESSURE, #30 TAB 4 Refills


   Prov:REINALDO REEVES MD         8/31/16


Clopidogrel Bisulfate (PLAVIX) 75 Mg Tablet, 1 TAB PO DAILY, #30 TAB 4 Refills


   Prov:REINALDO REEVES MD         8/31/16


Aspirin (ASPIR 81) 81 Mg Tablet.dr, 162 MG PO DAILY, #60 4 Refills


   Prov:REINALDO REEVES MD         8/31/16


Metoprolol Tartrate 25MG (LOPRESSER 25MG) 25 Mg Tablet, 50 MG PO BID for 

HYPERTENSION, #60 TAB 4 Refills


   Prov:REINALDO REEVES MD         8/10/16


Clopidogrel Bisulfate (PLAVIX) 75 Mg Tablet, 1 TAB PO DAILY, #30 TAB 4 Refills


   Prov:REINALDO REEVES MD         8/10/16


Reported Medications


Tramadol Hcl (TRAMADOL HCL) 50 Mg Tablet, 1 TAB PO TID for PAIN for 5 Days, TAB


   12/10/16


Doxylamine Succinate (SLEEP AID) 25 Mg Tablet, 25 MG PO HS for INSOMNIA, TABLET


   8/28/16


Aspirin (ASPIRIN) 81 Mg Tab.chew, 2 TAB PO DAILY, #30 TAB


   8/28/16


Simvastatin (SIMVASTATIN) 40 Mg Tablet, 1 TAB PO HS, #90 TAB 1 Refill


   8/28/16


Omega-3 Fatty Acids (FISH OIL) 300 Mg Capsule, 300 MG PO BID, CAPSULE


   8/28/16


Docusate Sodium (COLACE) 100 Mg Capsule, 200 MG PO PRN PRN for CONSTIPATION, 

CAPSULE


   8/28/16


Omega-3 Fatty Acids (FISH OIL) 500 Mg Capsule, 500 MG PO BID, CAPSULE


   8/28/16


Lisinopril (LISINOPRIL) 20 Mg Tablet, 25 MG PO DAILY, TABLET


   8/7/16


Simvastatin (SIMVASTATIN) 40 Mg Tablet, 1 TAB PO HS, #30 TAB 5 Refills


   8/7/16


Insulin Regular, Human (NOVOLIN R) 100 Unit/1 Ml Vial, 10 UNITS SQ TID


   INJECT THE NUMBER OF UNITS OF INSULIN AS SPECIFIED BY PROVIDER


   8/7/16





Past Medical History


Medical History:  coronary artery disease, cardiac problems, diabetes, high 

cholesterol, vascular disease


Surgical History:  cardiac cath, angioplasty, cholecystectomy, coronary bypass 

surgery, tubal, other





Social History


Drug Use:  none





Review of Systems


Constitutional:  no symptoms reported


EENTM:  no symptoms reported


Respiratory:  see HPI


Cardiovascular:  edema


Gastrointestinal:  no symptoms reported


All Other Systems:  Reviewed and Negative





Physical Exam


Neck:  Non-Tender, Full Range of Motion, Supple, Normal Inspection


Respiratory:  chest non-tender, lungs clear, normal breath sounds, no 

respiratory distress


Cardiovascular:  Normal Peripheral Pulses, Regular Rate, Rhythm, No Edema, No 

JVD


Gastrointestinal:  Normal Bowel Sounds, No Organomegaly, No Pulsatile Mass, 

Other (obese)


Extremities:  Normal Range of Motion, Pedal Edema, Other (right BKA)


Neurologic/Psychiatric:  CNs II-XII NML as Tested, No Motor/Sensory Deficits


Skin:  Normal Color





Results/Orders


Results/Orders





Orders - MARU CATNU MD


Cbc With Auto Diff (1/11/20 02:42)


Comprehensive Metabolic Panel (1/11/20 02:42)


Creatine Kinase (1/11/20 02:42)


Probnp    B-Type Np (1/11/20 02:42)


Troponin I (1/11/20 02:42)


D-Dimer (1/11/20 02:42)


Blood Culture (1/11/20 02:42)


Ekg-Routine (1/11/20 02:42)


Xr Chest 1v (1/11/20 02:42)


PT (1/11/20 02:42)


Partial Thromboplastin Time. (1/11/20 02:42)


Lactic Acid(Ml) (1/11/20 02:42)


Influenza A&B (1/11/20 02:42)


Cta Chest (1/11/20 04:07)


Hydrocodone/Acetaminophen (Norco 5mg) (1/11/20 05:03)


Hydrocodone/Acetaminophen (Norco 5mg) (1/11/20 05:02)





Vital Signs








  Date Time  Temp Pulse Resp B/P (MAP) Pulse Ox O2 Delivery O2 Flow Rate FiO2


 


1/11/20 04:50  61 20 145/66 (92) 97 Room Air  


 


1/11/20 02:36 97.5 64 20  96   


 


1/11/20 02:36 97.5 64 20 119/41 (67) 96 Room Air  


 


1/11/20 02:36 97.5 64 20     








Administered Medications








 Medications


  (Trade)  Dose


 Ordered  Sig/Jack


 Route


 PRN Reason  Start Time


 Stop Time Status Last Admin


Dose Admin


 


 Acetaminophen/


 Hydrocodone Bitart


  (Norco 5mg)  1 ea  STAT  STAT


 PO


   1/11/20 05:03


 1/11/20 05:05 DC 1/11/20 05:06


1 EA








                                Laboratory Tests








Test


 1/11/20


03:04 1/11/20


03:12


 


White Blood Count


 7.7 10^3/uL


(4.5-11.0) 





 


Red Blood Count


 4.19 10^6/uL


(4.00-5.20) 





 


Hemoglobin


 12.1 g/dL


(12.0-15.0) 





 


Hematocrit


 37.0 %


(36.0-46.0) 





 


Mean Corpuscular Volume


 88.3 fL


() 





 


Mean Corpuscular Hemoglobin


 28.9 pg


(26-34) 





 


Mean Corpuscular Hemoglobin


Concent 32.7 g/dL


(33-37)  L 





 


Red Cell Distribution Width


 14.6 %


(11.5-14.5)  H 





 


Platelet Count


 276 10^3/uL


(150-400) 





 


Mean Platelet Volume


 9.8 fL


(7.8-11.0) 





 


Neutrophils (%) (Auto)


 55.9 %


(41.0-85.0) 





 


Lymphocytes (%) (Auto)


 32.3 %


(24.0-44.0) 





 


Monocytes (%) (Auto)


 8.5 %


(5.0-12.0) 





 


Neutrophils # (Auto)


 4.3 10^3/uL


(1.8-7.7) 





 


Lymphocytes # (Auto)


 2.5 10^3/uL


(1.0-4.8) 





 


Monocytes # (Auto)


 0.7 10^3/uL


(0.3-0.8) 





 


Absolute Immature Granulocyte


(auto 0.03 10^3 u/L


(0-2) 





 


Absolute Eosinophils (auto)


 0.2 10^3/uL


(0.0-0.2) 





 


Immature Granulocytes %


 0.40 %


(0.00-0.50) 





 


Eosinophils %


 2.5 %


(0.0-5.0) 





 


Basophils %


 0.4 %


(0.0-0.2)  H 





 


Basophils #


 0.0 10^3/uL


(0.0-0.1) 





 


Prothrombin Time


 9.5 SEC


(9.4-11.5) 





 


Prothrombin Time INR


(Non-Therap) 0.9  


 





 


Activated Partial


Thromboplast Time 22.5 SEC


(24.67-30.72) 





 


D-Dimer


 0.59 mg/L


(0.19-0.49)  *H 





 


Sodium Level


 136 mmol/L


(132-145) 





 


Potassium Level


 4.3 mmol/L


(3.6-5.2) 





 


Chloride Level


 103.0 mmol/L


() 





 


Carbon Dioxide Level


 27.2 mmol/L


(20.0-32) 





 


Anion Gap 10.1   


 


Blood Urea Nitrogen


 20 mg/dL


(7-18)  H 





 


Creatinine


 1.07 mg/dL


(0.59-1.40) 





 


Estimated GFR (


American) 62.9 (>/=60)  


 





 


Est GFR (CKD-EPI)(Non-Afr


American) 52.0 (>/=60)  


 





 


BUN/Creatinine Ratio 18.0   


 


Glucose Level


 333 mg/dL


()  H 





 


Lactic Acid Level


 1.2 mmol/L


(0.50-2.00) 





 


Calcium Level


 8.7 mg/dL


(8.4-10.5) 





 


Total Bilirubin


 0.5 mg/dL


(0.2-1.0) 





 


Aspartate Amino Transferase


(AST) 12 U/L (0-35)  


 





 


Alanine Aminotransferase (ALT)


 18 U/L (12-78)


 





 


Alkaline Phosphatase


 83 U/L


() 





 


Total Creatine Kinase


 98 U/L


() 





 


Troponin I


 < 0.02 ng/mL


(0.00-0.05) 





 


Pro-B-Type Natriuretic Peptide


 1869 pg/mL


(0-125)  H 





 


Total Protein


 6.5 g/dL


(6.4-8.2) 





 


Albumin


 3.0 g/dL


(3.4-5.0)  L 





 


Globulin 3.5   


 


Influenza Type A Antigen


 


 NEGATIVE (NEG)





 


Influenza B Immunofluorescence


 


 NEGATIVE (NEG)














Progress


Progress


CTA Chest: No evidence of pulmonary embolic disease.


2.  The appearance raises question of congestive heart failure or volume 


overload.





EKG/XRAY/CT/US


EKG:  NSR





Departure


Time of Disposition:  05:24


Disposition:  02 XFER SHT-TRM HOSP


Impression:  


   Primary Impression:  


   CHF exacerbation


   Additional Impression:  


   Volume overload


Condition:  Stable


Referrals:  


ROMEO AVERY (PCP)


PRIMARY CARE PROVIDER


Comments


Transfer to [facility] ED for Dr. Smart


Duration or Time Spent with Pa:  60 mins





Problem Qualifiers








   Primary Impression:  


   CHF exacerbation


   Heart failure type:  unspecified  Qualified Codes:  I50.9 - Heart failure, 

   unspecified


   Additional Impression:  


   Volume overload


   Hypervolemia type:  unspecified  Qualified Codes:  E87.70 - Fluid overload, 

   unspecified








MARU CANTU MD                Jan 11, 2020 02:47

## 2020-01-23 ENCOUNTER — HOSPITAL ENCOUNTER (EMERGENCY)
Dept: HOSPITAL 65 - ER | Age: 65
Discharge: ANOTHER HOSPITAL | End: 2020-01-23
Payer: MEDICARE

## 2020-01-23 VITALS — SYSTOLIC BLOOD PRESSURE: 113 MMHG | DIASTOLIC BLOOD PRESSURE: 42 MMHG

## 2020-01-23 VITALS — SYSTOLIC BLOOD PRESSURE: 126 MMHG | DIASTOLIC BLOOD PRESSURE: 49 MMHG

## 2020-01-23 VITALS — DIASTOLIC BLOOD PRESSURE: 76 MMHG | SYSTOLIC BLOOD PRESSURE: 132 MMHG

## 2020-01-23 VITALS — BODY MASS INDEX: 35.33 KG/M2 | WEIGHT: 192 LBS | HEIGHT: 62 IN

## 2020-01-23 DIAGNOSIS — Z79.82: ICD-10-CM

## 2020-01-23 DIAGNOSIS — Z88.5: ICD-10-CM

## 2020-01-23 DIAGNOSIS — J90: ICD-10-CM

## 2020-01-23 DIAGNOSIS — Z79.4: ICD-10-CM

## 2020-01-23 DIAGNOSIS — E11.9: ICD-10-CM

## 2020-01-23 DIAGNOSIS — I50.9: Primary | ICD-10-CM

## 2020-01-23 DIAGNOSIS — Z88.6: ICD-10-CM

## 2020-01-23 DIAGNOSIS — Z79.899: ICD-10-CM

## 2020-01-23 LAB
ALP INTEST CFR SERPL: 75 U/L (ref 50–136)
ALT SERPL-CCNC: 15 U/L (ref 12–78)
AST SERPL-CCNC: 14 U/L (ref 0–35)
BASOPHILS # BLD AUTO: 0 10^3/UL (ref 0–0.1)
BASOPHILS NFR BLD AUTO: 0.5 % (ref 0–0.2)
CALCIUM SERPL-MCNC: 8.7 MG/DL (ref 8.4–10.5)
CO2 BLDA-SCNC: 23.8 MMOL/L (ref 20–32)
EOSINOPHIL # BLD AUTO: 0.1 10^3/UL (ref 0–0.2)
EOSINOPHIL NFR BLD AUTO: 1.9 % (ref 0–5)
ERYTHROCYTE [DISTWIDTH] IN BLOOD BY AUTOMATED COUNT: 14.7 % (ref 11.5–14.5)
GLUCOSE PRE 100 G GLC PO SERPL-MCNC: 372 MG/DL (ref 70–110)
LYMPHOCYTES # BLD AUTO: 2.2 10^3/UL1 (ref 1–4.8)
LYMPHOCYTES NFR BLD AUTO: 30 % (ref 24–44)
MCH RBC QN AUTO: 29.6 PG (ref 26–34)
MONOCYTES # BLD AUTO: 0.6 10^3/UL (ref 0.3–0.8)
MONOCYTES NFR BLD AUTO: 7.8 % (ref 5–12)
NEUTROPHILS # BLD AUTO: 4.4 10^3/UL (ref 1.8–7.7)
NEUTROPHILS NFR BLD AUTO: 59.4 % (ref 41–85)
PLATELET # BLD AUTO: 273 10^3/UL (ref 150–400)

## 2020-01-23 PROCEDURE — 93005 ELECTROCARDIOGRAM TRACING: CPT

## 2020-01-23 PROCEDURE — 84484 ASSAY OF TROPONIN QUANT: CPT

## 2020-01-23 PROCEDURE — 85025 COMPLETE CBC W/AUTO DIFF WBC: CPT

## 2020-01-23 PROCEDURE — 85610 PROTHROMBIN TIME: CPT

## 2020-01-23 PROCEDURE — 71045 X-RAY EXAM CHEST 1 VIEW: CPT

## 2020-01-23 PROCEDURE — 83880 ASSAY OF NATRIURETIC PEPTIDE: CPT

## 2020-01-23 PROCEDURE — 36415 COLL VENOUS BLD VENIPUNCTURE: CPT

## 2020-01-23 PROCEDURE — 96374 THER/PROPH/DIAG INJ IV PUSH: CPT

## 2020-01-23 PROCEDURE — 80053 COMPREHEN METABOLIC PANEL: CPT

## 2020-01-23 PROCEDURE — 82550 ASSAY OF CK (CPK): CPT

## 2020-01-23 PROCEDURE — 82553 CREATINE MB FRACTION: CPT

## 2020-01-23 PROCEDURE — 71275 CT ANGIOGRAPHY CHEST: CPT

## 2020-01-23 PROCEDURE — 82948 REAGENT STRIP/BLOOD GLUCOSE: CPT

## 2020-01-23 PROCEDURE — 82803 BLOOD GASES ANY COMBINATION: CPT

## 2020-01-23 PROCEDURE — 85379 FIBRIN DEGRADATION QUANT: CPT

## 2020-01-23 PROCEDURE — 99285 EMERGENCY DEPT VISIT HI MDM: CPT

## 2020-01-23 PROCEDURE — 96372 THER/PROPH/DIAG INJ SC/IM: CPT

## 2020-01-23 PROCEDURE — 85730 THROMBOPLASTIN TIME PARTIAL: CPT

## 2020-01-23 NOTE — DIREP
PROCEDURE:CHEST 1 VIEW

 

COMPARISON:[facility], CR, XRAY CHEST 2 VWS, 11/13/2019, 11:13 

PM.  [facility], CR, XRAY CHEST SINGLE VW, 01/11/2020, 02:29 AM.

 

INDICATIONS:chest pain

 

FINDINGS:

LUNGS/PLEURA:There is a shallow inspiration with mild increased interstitial 

markings bilaterally with subsegmental atelectasis in the right base.  No 

infiltrate or pleural effusion is seen.

VASCULATURE:The pulmonary vascular markings are accentuated secondary to a 

shallow inspiration.

CARDIAC:Previous sternotomy changes are seen with the heart size normal.

MEDIASTINUM:Normal.  No visible mass or adenopathy. 

BONES:Degenerative changes are seen in both acromioclavicular joints.

OTHER:Negative.  

 

CONCLUSION:Previous sternotomy changes with a very shallow inspiration with 

mild increased interstitial markings bilaterally with linear subsegmental 

atelectasis in the right base without infiltrate.

 

 

 

Dictated by: Will Guzmán M.D. on 01/23/2020 at 02:27 PM     

Electronically Signed By: Will Guzmán M.D. on 01/23/2020 at 02:29 PM

## 2020-01-23 NOTE — ER.PDOC
General


Chief Complaint:  Chest Pain-Cardiac Nature


Stated Complaint:  CHEST PAIN


Time seen by MD:  13:40


Source:  patient





History of Present Illness


Timing/Duration:  1-3 hours


Severity/Quality:  moderate


Radiation:  no radiation


Activities at Onset:  rest


Prior CP/Workup:  Cardiac Cath, Heart Attack


Nitro Today/Relief:  No Nitro Taken Today


Associated Symptoms:  denies symptoms


Allergies:  


Coded Allergies:  


     codeine (Verified  Allergy, Unknown, 16)


     ibuprofen (Verified  Allergy, Unknown, 16)


     morphine (Verified  Allergy, Unknown, 16)


     naproxen (Verified  Allergy, Unknown, 16)


Home Meds


Active Scripts


Clopidogrel Bisulfate (PLAVIX) 75 Mg Tablet, 1 TAB PO DAILY, #30 TAB 4 Refills


   Prov:REINALDO REEVES MD         8/10/16


Reported Medications


Simvastatin (SIMVASTATIN) 80 Mg Tablet, 80 MG PO DAILY24, TAB


   20


Metoprolol Succinate (METOPROLOL SUCCINATE) 25 Mg Tab.er.24h, 25 MG PO DAILY24, 

TABLET


   20


Pioglitazone Hcl (ACTOS) 30 Mg Tablet, 30 MG PO DAILY24, TAB


   20


Insulin Detemir (Levemir Flextouch) 100 Unit/1 Ml Insuln.pen, 100 UNIT SQ AM, 

#20 UNIT


   20


Donepezil Hcl (DONEPEZIL HCL) 10 Mg Tablet, 10 MG PO HS, TAB


   20


Citalopram Hydrobromide (CITALOPRAM HBR) 40 Mg Tablet, 40 MG PO DAILY24, TAB


   20


Losartan Potassium (LOSARTAN POTASSIUM) 25 Mg Tablet, 25 MG PO DAILY24, TAB


   20


Trazodone Hcl (TRAZODONE HCL) 50 Mg Tablet, 50 MG PO HS, TAB


   20


Gabapentin (GABAPENTIN) 300 Mg Capsule, 300 MG PO TID, CAPSULE


   20


Sitagliptin Phosphate (JANUVIA) 100 Mg Tablet, 100 MG PO DAILY24, TAB


   20


Furosemide (LASIX) 20 Mg Tablet, 10 MG PO DAILY24, TAB


   20


Potassium Chloride (POTASSIUM CHLORIDE) 8 Meq Capsule.er, 8 MEQ PO DAILY24, 

CAP.SR


   20


Buspirone Hcl (BUSPIRONE HCL) 10 Mg Tablet, 10 MG PO DAILY24, TAB


   20


Metformin Hcl (METFORMIN HCL) 1,000 Mg Tablet, 1000 MG PO BID, TAB


   20


Aspirin (ASPIRIN) 81 Mg Tab.chew, 2 TAB PO DAILY, #30 TAB


   16


Discontinued Reported Medications


Tramadol Hcl (TRAMADOL HCL) 50 Mg Tablet, 1 TAB PO TID for PAIN for 5 Days, TAB


   12/10/16


Doxylamine Succinate (SLEEP AID) 25 Mg Tablet, 25 MG PO HS for INSOMNIA, TABLET


   16


Simvastatin (SIMVASTATIN) 40 Mg Tablet, 1 TAB PO HS, #90 TAB 1 Refill


   16


Omega-3 Fatty Acids (FISH OIL) 300 Mg Capsule, 300 MG PO BID, CAPSULE


   16


Docusate Sodium (COLACE) 100 Mg Capsule, 200 MG PO PRN PRN for CONSTIPATION, 

CAPSULE


   16


Omega-3 Fatty Acids (FISH OIL) 500 Mg Capsule, 500 MG PO BID, CAPSULE


   16


Lisinopril (LISINOPRIL) 20 Mg Tablet, 25 MG PO DAILY, TABLET


   16


Simvastatin (SIMVASTATIN) 40 Mg Tablet, 1 TAB PO HS, #30 TAB 5 Refills


   16


Insulin Regular, Human (NOVOLIN R) 100 Unit/1 Ml Vial, 10 UNITS SQ TID


   INJECT THE NUMBER OF UNITS OF INSULIN AS SPECIFIED BY PROVIDER


   16


Discontinued Scripts


Nitroglycerin (NITROGLYCERIN) 0.4 Mg Tab.subl, 0.4 MG SL DAILY PRN for CHEST 

PAIN, #30


   Prov:REINALDO REEVES MD         16


Metoprolol Tartrate 25MG (LOPRESSER 25MG) 25 Mg Tablet, 50 MG PO DAILY for BLOOD

PRESSURE, #30 TAB 4 Refills


   Prov:REINALDO REEVES MD         16


Clopidogrel Bisulfate (PLAVIX) 75 Mg Tablet, 1 TAB PO DAILY, #30 TAB 4 Refills


   Prov:REINALDO REEVES MD         16


Aspirin (ASPIR 81) 81 Mg Tablet.dr, 162 MG PO DAILY, #60 4 Refills


   Prov:REINALDO REEVES MD         16


Metoprolol Tartrate 25MG (LOPRESSER 25MG) 25 Mg Tablet, 50 MG PO BID for 

HYPERTENSION, #60 TAB 4 Refills


   Prov:REINALDO REEVES MD         8/10/16





Past Medical History


Medical History:  cardiac problems, diabetes, other


Surgical History:  cholecystectomy, , other





Social History


Alcohol Use:  none


Drug Use:  none





Constitutional:  denies chills, denies fever


EENTM:  denies ear pain


Respiratory:  denies cough


Cardiovascular:  chest pain; denies irregular heart rate, denies palpitations


Gastrointestinal:  denies abdominal pain


Genitourinary:  denies pain


Musculoskeletal:  denies neck pain


Skin:  denies rash


Psychiatric/Neurological:  denies headache





Physical Exam


General Appearance:  No Apparent Distress


HEENT:  PERRL/EOMI, Normal ENT Inspection


Neck:  Non-Tender


Respiratory:  lungs clear, normal breath sounds


Cardiovascular:  Normal Peripheral Pulses, Regular Rate, Rhythm


Neurologic/Psychiatric:  CNs II-XII NML as Tested, No Motor/Sensory Deficits, 

Alert


Skin:  Normal Color





Results/Orders


Results/Orders





Vital Signs








  Date Time  Temp Pulse Resp B/P (MAP) Pulse Ox O2 Delivery O2 Flow Rate FiO2


 


20 13:27 97.6 74 21 132/76 (94)  Room Air  


 


20 13:27 97.6 74 21     


 


20 13:27 97.6 74 21     











Progress


Progress


discussed case with mani from [facility] one call for admission to [facility] per patient 

request and per patient cardiologist dr ling to see. discussed with cindy mcnally accepting physician to the [facility]. [phone]





Departure


Time of Disposition:  16:03


Disposition:  02 XFER SHT-TRM HOSP


Impression:  


   Primary Impression:  


   Chest pain


   Additional Impressions:  


   Pleural effusion


   CHF (congestive heart failure)


Condition:  Improved


Referrals:  


ROMEO AVERY (PCP)


PRIMARY CARE PROVIDER


Duration or Time Spent with Pa:  20





Problem Qualifiers











TOÑITO MONTES DE OCA MD           2020 13:58

## 2020-01-23 NOTE — NUR
EMS



[facility] CALLED TO SAY THAT THEIR TRUCK IS AN HOUR OUT AND WILL BE 
CONTACTING ANAIS TO SEE WHAT TO DO.  [facility] TRUCK WILL TAKE JUST AS 
LONG.

## 2020-01-23 NOTE — DIREP
PROCEDURE:CTA CHEST

 

COMPARISON:[facility], CT, CTA CHEST, 01/11/2020, 04:20 AM.

 

INDICATIONS:chest pain with elevated d dimer

 

TECHNIQUE:Post contrast axial images through the chest with multiplanar MIP/3D 

reconstructions.  

 

FINDINGS:

PULMONARY ARTERIES:No evidence for pulmonary embolism.  There are respiratory 

motion artifact present at the lung bases obscuring segmental pulmonary artery 

evaluation in the lower lobes.

LUNGS:Small to moderate bilateral pleural effusions with compressive 

atelectatic changes.  Hazy ground-glass opacities scattered throughout the 

lungs may signify pneumonitis or atelectatic changes.

CARDIAC:Post CABG changes.  Dense coronary vascular calcifications.

RV:LV ratio (norm <0.9): Not applicable in the absence of pulmonary embolism.

THYROID:Normal.

THORACIC AORTA:Normal.

MEDIASTINUM:Normal.

BONES:Normal.

OTHER:No additional findings.  

 

CONCLUSION:

1.  Evaluation of the lower lobe arteries segmental pulmonary artery is limited 

by respiratory motion artifact.  The remainder of the pulmonary arteries are 

patent.

 

2.  Small to moderate bilateral pleural effusions with scattered ground-glass 

opacities throughout the lungs that could represent pneumonitis or atelectatic 

change.  Clinical correlation CHF is also recommended.

3.  Dense coronary vascular calcifications with post CABG changes.

 

Dictated by: Nicolas Wheatley MD on 01/23/2020 at 03:39 PM     

Electronically Signed By: Nicolas Wheatley MD on 01/23/2020 at 03:48 PM

## 2020-01-23 NOTE — PCM.EKG
[facility]

                                       

Test Date:    2020               Test Time:    13:33:11

Pat Name:     BIRDIE ROMERO             Department:   

Patient ID:   [id number]          Room:          

Gender:       F                        Technician:   TB

:          1955               Requested By: MAN ASHLEY

Order Number: [id number]           Reading MD:   Man Ashley

                                 Measurements

Intervals                              Axis          

Rate:         73                       P:            6

AK:           126                      QRS:          -31

QRSD:         112                      T:            37

QT:           431                                    

QTc:          475                                    

                           Interpretive Statements

Sinus rhythm

Incomplete left bundle branch block

LVH with secondary repolarization abnormality

Compared to ECG 2020 02:55:05

Left bundle-branch block now present

Left ventricular hypertrophy now present

Early repolarization now present

Myocardial infarct finding no longer present



Electronically Signed On 2020 15:13:22 CST by Man Ashley



Please click the below link to view image of tracing.

## 2020-01-23 NOTE — NUR
Patient's pain level greatly improved following administration of 1000 mg 
acetaminophen. Patient smiled when questioned and stated "Much better."

## 2020-01-23 NOTE — NUR
UPDATE

PATIENT CONTINUES TO WAIT FOR EMS TO BECOME AVAILABLE FOR TRANSFER TO [facility].

## 2020-02-12 ENCOUNTER — HOSPITAL ENCOUNTER (OUTPATIENT)
Dept: HOSPITAL 65 - CT | Age: 65
Discharge: HOME | End: 2020-02-12
Attending: INTERNAL MEDICINE
Payer: MEDICARE

## 2020-02-12 DIAGNOSIS — M50.221: ICD-10-CM

## 2020-02-12 DIAGNOSIS — M48.061: ICD-10-CM

## 2020-02-12 DIAGNOSIS — M50.222: ICD-10-CM

## 2020-02-12 DIAGNOSIS — M47.897: ICD-10-CM

## 2020-02-12 DIAGNOSIS — M47.816: ICD-10-CM

## 2020-02-12 DIAGNOSIS — M47.892: ICD-10-CM

## 2020-02-12 DIAGNOSIS — M48.02: ICD-10-CM

## 2020-02-12 DIAGNOSIS — M50.323: Primary | ICD-10-CM

## 2020-02-12 PROCEDURE — 72110 X-RAY EXAM L-2 SPINE 4/>VWS: CPT

## 2020-02-12 PROCEDURE — 72050 X-RAY EXAM NECK SPINE 4/5VWS: CPT

## 2020-02-12 PROCEDURE — 72125 CT NECK SPINE W/O DYE: CPT

## 2020-02-12 PROCEDURE — 72131 CT LUMBAR SPINE W/O DYE: CPT

## 2020-02-12 NOTE — DIREP
PROCEDURE:CT CERVICAL SPINE WITHOUT CONTRAST

 

TECHNIQUE:Axial cuts were obtained through the cervical spine.  The images 

were viewed at bone settings.  Sagittal reconstructions are provided.  

 

COMPARISON:[facility], , XRAY SPINE CERVICAL COMP, 02/12/2020, 

10:59 AM.

 

INDICATIONS:M54.2 CERVICALGIA, M47.892 SPONDYLOSIS, G89.4 CHRONIC PAIN 

SYNDROME

 

FINDINGS:

ALIGNMENT:Normal.

VERTEBRAE:Normal.

PARASPINAL AREA:Normal.

OTHER:No additional findings.

 

CERVICAL DISC LEVELS

C2-C3:Normal.

C3-C4:Normal.

C4-C5:There is moderate left neural foraminal stenosis secondary to 

uncovertebral hypertrophy.  There is moderate loss of intervertebral disc 

height.  Mild disc bulge with traction osteophytosis

C5-C6:There is mild left neural foraminal stenosis secondary to uncovertebral 

hypertrophy.  There is moderate loss of intervertebral disc height.  

C6-C7:There is mild loss of intervertebral disc height.  

C7-T1:Normal.

 

CONCLUSION:

1. Multilevel degenerative disc disease as above.  There is left foraminal 4/5 

and 5-6

 

 

 

Dictated by: Oren Carrillo Jr. on 02/12/2020 at 01:20 PM     

Electronically Signed By: Oren Carrillo Jr. on 02/12/2020 at 01:22 PM

## 2020-02-12 NOTE — DIREP
PROCEDURE: CT SPINE LUMBAR W/O

 

TECHNIQUE:Axial cuts were obtained through the lumbar spine.  The images were 

viewed at bone settings.  

 

COMPARISON:None.

 

INDICATIONS:M54.5 LUMBAGO, M47.892 SPONDYLOSIS, G89.4 CHRONIC PAIN SYNDROME

 

FINDINGS:

ALIGNMENT:Normal.

VERTEBRAE:Normal.

PARASPINAL AREA:Normal.

OTHER:No additional findings.

 

LUMBAR DISC LEVELS

T12-L1:Normal.

L1-L2:Normal.

L2-L3:Normal.

L3-L4:Normal.

L4-L5:Normal.

L5-S1: There is mild loss of disc height.  Vacuum disc phenomenon.  Diffuse 

disc bulge with a left lateral prominence.  There is encroachment upon the left 

lateral recess and moderate left neural foraminal narrowing.  No canal stenosis 

 

 

CONCLUSION:

1. Degenerative change at L5/S1 with encroachment upon the left lateral recess 

and left foraminal narrowing.

 

 

 

Dictated by: Oren Carrillo Jr. on 02/12/2020 at 01:22 PM     

Electronically Signed By: Oren Carrillo Jr. on 02/12/2020 at 01:43 PM

## 2020-02-12 NOTE — DIREP
PROCEDURE:C-Spine 5 views (AP/lateral/oblique/dens)

 

TECHNIQUE:AP, lateral, bilateral oblique and dens views of the cervical spine 

are provided.  

 

COMPARISON:None.

 

INDICATIONS:M47.892 SPONDYLOSIS, M54.2 CERVICALGIA, G89.4 CHRONIC PAIN SYDROME

 

FINDINGS:

ALIGNMENT:Normal.

VERTEBRAE:Normal.

DISK SPACES:There is mild degenerative loss of disk height at the C4-5 level.  

 

There is moderate degenerative loss of disk height at the C5-6 level.  

There is mild degenerative loss of disk height at the C6-7 level.  

CERVICAL RIBS:None.

JOINTS:At C4-5, there is right-sided moderate uncovertebral joint hypertrophy, 

resulting in moderate bony neural foraminal stenosis.  

At C5-6, there is bilateral moderate uncovertebral joint hypertrophy, 

resulting in moderate bony neural foraminal stenosis.  

OTHER: Sternotomy wires and coronary ostial markers in keeping with CABG.  

 

 

CONCLUSION:

Mild C4-5 degenerative disk disease.  

Moderate C5-6 degenerative disk disease.  

Mild C6-7 degenerative disk disease.  

Moderate right-sided C4-5 neural foraminal stenosis due to moderate 

uncovertebral joint hypertrophy.  

Moderate bilateral C5-6 neural foraminal stenosis due to moderate uncovertebral 

joint hypertrophy.  

 

 

 

 

Dictated by: Oren Carrillo Jr. on 02/12/2020 at 12:40 PM     

Electronically Signed By: Oren Carrillo Jr. on 02/12/2020 at 12:43 PM

## 2020-02-12 NOTE — DIREP
PROCEDURE:XRAY SPINE LUMBAR MIN 4 VWS

 

COMPARISON:None.

 

INDICATIONS:M47.897 SPONDYLOSIS, M54.5 LUMBAGO, G89.4 CHRONIC PAIN SYNDROME

 

TECHNIQUE:AP, lateral, bilateral oblique, lateral flexion/extension, and coned 

down lateral views of the lumbar spine are provided.  

 

FINDINGS:

ALIGNMENT:Normal.

VERTEBRAE:No pars defects.  No visible compression fracture of the lumbar 

vertebra.  Small endplate osteophytes upper lumbar spine.  Bones are 

osteopenic.  Mild facet arthropathy lower lumbar spine.

DISK SPACES:Mild height loss at L5-S1, T12-L1.

SPONDYLOLISTHESIS:No abnormal motion with flexion or extension.

SACROILIAC JOINTS:Normal.

OTHER:Status post cholecystectomy.

 

CONCLUSION:Mild degenerative changes.  No abnormal motion with flexion or 

extension.

 

 

 

 

 

Dictated by: Silvestre Sanches M.D. on 02/12/2020 at 12:48 PM     

Electronically Signed By: Silvestre Sanches M.D. on 02/12/2020 at 12:53 PM

## 2020-03-22 ENCOUNTER — HOSPITAL ENCOUNTER (EMERGENCY)
Dept: HOSPITAL 65 - ER | Age: 65
Discharge: HOME | End: 2020-03-22
Payer: MEDICARE

## 2020-03-22 VITALS — DIASTOLIC BLOOD PRESSURE: 80 MMHG | SYSTOLIC BLOOD PRESSURE: 121 MMHG

## 2020-03-22 VITALS — SYSTOLIC BLOOD PRESSURE: 121 MMHG | DIASTOLIC BLOOD PRESSURE: 80 MMHG

## 2020-03-22 VITALS — HEIGHT: 63 IN | BODY MASS INDEX: 33.84 KG/M2 | WEIGHT: 191 LBS

## 2020-03-22 VITALS — SYSTOLIC BLOOD PRESSURE: 148 MMHG | DIASTOLIC BLOOD PRESSURE: 72 MMHG

## 2020-03-22 VITALS — SYSTOLIC BLOOD PRESSURE: 129 MMHG | DIASTOLIC BLOOD PRESSURE: 60 MMHG

## 2020-03-22 DIAGNOSIS — E11.621: ICD-10-CM

## 2020-03-22 DIAGNOSIS — Z79.4: ICD-10-CM

## 2020-03-22 DIAGNOSIS — Z90.49: ICD-10-CM

## 2020-03-22 DIAGNOSIS — L97.529: ICD-10-CM

## 2020-03-22 DIAGNOSIS — Z88.5: ICD-10-CM

## 2020-03-22 DIAGNOSIS — E11.65: Primary | ICD-10-CM

## 2020-03-22 DIAGNOSIS — I50.9: ICD-10-CM

## 2020-03-22 DIAGNOSIS — I11.0: ICD-10-CM

## 2020-03-22 DIAGNOSIS — Z79.82: ICD-10-CM

## 2020-03-22 DIAGNOSIS — Z88.6: ICD-10-CM

## 2020-03-22 DIAGNOSIS — Z79.899: ICD-10-CM

## 2020-03-22 LAB
ALP INTEST CFR SERPL: 107 U/L (ref 50–136)
ALT SERPL-CCNC: 19 U/L (ref 12–78)
APPEARANCE UR: (no result)
AST SERPL-CCNC: 18 U/L (ref 0–35)
BASOPHILS # BLD AUTO: 0.1 10^3/UL (ref 0–0.1)
BASOPHILS NFR BLD AUTO: 0.6 % (ref 0–0.2)
BILIRUB UR STRIP.AUTO-MCNC: NEGATIVE MG/DL
CALCIUM SERPL-MCNC: 9.7 MG/DL (ref 8.4–10.5)
CO2 BLDA-SCNC: 21.6 MMOL/L (ref 20–32)
COLOR UR: YELLOW
EOSINOPHIL # BLD AUTO: 0.1 10^3/UL (ref 0–0.2)
EOSINOPHIL NFR BLD AUTO: 0.8 % (ref 0–5)
ERYTHROCYTE [DISTWIDTH] IN BLOOD BY AUTOMATED COUNT: 13.3 % (ref 11.5–14.5)
GLUCOSE PRE 100 G GLC PO SERPL-MCNC: 413 MG/DL (ref 70–110)
LYMPHOCYTES # BLD AUTO: 2.17 10^3/UL1 (ref 1–4.8)
LYMPHOCYTES NFR BLD AUTO: 25 % (ref 24–44)
MCH RBC QN AUTO: 28.2 PG (ref 26–34)
MONOCYTES # BLD AUTO: 0.5 10^3/UL (ref 0.3–0.8)
MONOCYTES NFR BLD AUTO: 5.5 % (ref 5–12)
NEUTROPHILS # BLD AUTO: 5.9 10^3/UL (ref 1.8–7.7)
NEUTROPHILS NFR BLD AUTO: 67.5 % (ref 41–85)
PLATELET # BLD AUTO: 310 10^3/UL (ref 150–400)
UROBILINOGEN UR QL STRIP.AUTO: NORMAL
YEAST URNS QL MICRO: (no result)

## 2020-03-22 PROCEDURE — 80053 COMPREHEN METABOLIC PANEL: CPT

## 2020-03-22 PROCEDURE — 87040 BLOOD CULTURE FOR BACTERIA: CPT

## 2020-03-22 PROCEDURE — 96374 THER/PROPH/DIAG INJ IV PUSH: CPT

## 2020-03-22 PROCEDURE — 96376 TX/PRO/DX INJ SAME DRUG ADON: CPT

## 2020-03-22 PROCEDURE — 82553 CREATINE MB FRACTION: CPT

## 2020-03-22 PROCEDURE — 96361 HYDRATE IV INFUSION ADD-ON: CPT

## 2020-03-22 PROCEDURE — 82010 KETONE BODYS QUAN: CPT

## 2020-03-22 PROCEDURE — 93005 ELECTROCARDIOGRAM TRACING: CPT

## 2020-03-22 PROCEDURE — 87086 URINE CULTURE/COLONY COUNT: CPT

## 2020-03-22 PROCEDURE — 82550 ASSAY OF CK (CPK): CPT

## 2020-03-22 PROCEDURE — 81000 URINALYSIS NONAUTO W/SCOPE: CPT

## 2020-03-22 PROCEDURE — 87804 INFLUENZA ASSAY W/OPTIC: CPT

## 2020-03-22 PROCEDURE — 82948 REAGENT STRIP/BLOOD GLUCOSE: CPT

## 2020-03-22 PROCEDURE — 36415 COLL VENOUS BLD VENIPUNCTURE: CPT

## 2020-03-22 PROCEDURE — 82803 BLOOD GASES ANY COMBINATION: CPT

## 2020-03-22 PROCEDURE — 99285 EMERGENCY DEPT VISIT HI MDM: CPT

## 2020-03-22 PROCEDURE — 83605 ASSAY OF LACTIC ACID: CPT

## 2020-03-22 PROCEDURE — 84484 ASSAY OF TROPONIN QUANT: CPT

## 2020-03-22 PROCEDURE — 71045 X-RAY EXAM CHEST 1 VIEW: CPT

## 2020-03-22 PROCEDURE — 85025 COMPLETE CBC W/AUTO DIFF WBC: CPT

## 2020-03-22 NOTE — DIREP
PROCEDURE:XRAY FOOT MIN 3 VWS-LT

 

COMPARISON:None.

 

INDICATIONS:diabetic with chronic foot uncler

 

FINDINGS:

BONES:Degenerative changes, osteophyte especially along the inferior 

calcaneus.  No fracture.  No lytic process is identified.

JOINTS:Normal.

SOFT TISSUES:Normal.

OTHER:No additional findings.

 

CONCLUSION:No lytic process to indicate osteomyelitis is identified.  

Degenerative changes.

 

 

 

Dictated by: Oren Nguyen MD on 03/22/2020 at 11:00 AM     

Electronically Signed By: Oren Nguyen MD on 03/22/2020 at 11:03 AM

## 2020-03-22 NOTE — NUR
ARRIVAL

PT ARRIVED VIA EMS WITH C/O SHOULDER AND BLE CRAMPING/ PAIN. PT REPORTS HAVING 
RUNNY NOSE. PT STATES SHE IS CONFUSED. PT CURRENTLY HAS WOUND TO TOES ON LLE 
AND STATES SHE IS BEING SEEN BY HOME HEALTH FOR WOUND TREATMENT. PT IS A GOOD 
HISTORIAN OF EVENTS OVER THE LAST FEW DAYS, HER MEDICAL AND MEDICATION HISOTRY. 
PT DOES NOT RECALL THE NAME OF THE ANTIBIOTICS SHE IS CURRENTLY TAKING FOR HER 
INFECTION ON HER TOES. BEDSIDE MONITORS APPLIED. VITAL SIGNS STABLE. BED IN LOW 
LOCKED POSITION WITH SIDERAILS X2. CALL LIGHT WITHIN REACH. ALL STAFF ENTERING 
ROOM ENCOURAGED TO USE MASK D/T RESPIRATORY COMPLAINT.

## 2020-03-22 NOTE — PCM.EKG
[facility]

                                       

Test Date:    2020               Test Time:    10:34:14

Pat Name:     BIRDIE ROMERO             Department:   

Patient ID:   [id number]          Room:          

Gender:       F                        Technician:   LUCY

:          1955               Requested By: MAN ASHLEY

Order Number: [id number]           Reading MD:   Man Ashley

                                 Measurements

Intervals                              Axis          

Rate:         68                       P:            36

ME:           129                      QRS:          -40

QRSD:         109                      T:            -23

QT:           420                                    

QTc:          447                                    

                           Interpretive Statements

Sinus rhythm

Left axis deviation

Nonspecific T abnormalities, inferior leads

Compared to ECG 2020 13:33:11

Left-axis deviation now present

T-wave abnormality now present

Left bundle-branch block no longer present

Left ventricular hypertrophy no longer present

Early repolarization no longer present

no acute st changes as same as previous ecg from 2020

Electronically Signed On 3- 11:08:07 CDT by Man Ashley



Please click the below link to view image of tracing.

## 2020-03-22 NOTE — ER.PDOC
General


Chief Complaint:  General Complaint


Stated Complaint:  SHOULDER PAIN


TRAVEL OUT OF US:  No


Time seen by MD:  10:10


Source:  patient, EMS





History of Present Illness


Initial Comments


patient is here for multiple complaints, she states she has cramping in her 

hamstrings and shoulders, she has a runny nose with mild cough and body aches, 

she also has a complaint of her toes on the left foot, she states that she has 

an ulcer on the left foot toe and that she has home health care come over for 3 

days a week to be dressed, she also states he blood sugar is high and that she 

did not take her insulin today. no vomiting/diarrhea/chest pain or abd pain. no 

travel or exposure to coronavirus.


Timing/Duration:  unsure


Severity:  mild


Modifying Factors:  improves with movement


Allergies:  


Coded Allergies:  


     codeine (Verified  Allergy, Unknown, 16)


     ibuprofen (Verified  Allergy, Unknown, 16)


     morphine (Verified  Allergy, Unknown, 16)


     naproxen (Verified  Allergy, Unknown, 16)


Home Meds


Active Scripts


Clopidogrel Bisulfate (PLAVIX) 75 Mg Tablet, 1 TAB PO DAILY, #30 TAB 4 Refills


   Prov:REINALDO REEVES MD         8/10/16


Reported Medications


Simvastatin (SIMVASTATIN) 80 Mg Tablet, 80 MG PO DAILY24, TAB


   20


Metoprolol Succinate (METOPROLOL SUCCINATE) 25 Mg Tab.er.24h, 25 MG PO DAILY24, 

TABLET


   20


Pioglitazone Hcl (ACTOS) 30 Mg Tablet, 30 MG PO DAILY24, TAB


   20


Insulin Detemir (Levemir Flextouch) 100 Unit/1 Ml Insuln.pen, 100 UNIT SQ AM, 

#20 UNIT


   20


Donepezil Hcl (DONEPEZIL HCL) 10 Mg Tablet, 10 MG PO HS, TAB


   20


Citalopram Hydrobromide (CITALOPRAM HBR) 40 Mg Tablet, 40 MG PO DAILY24, TAB


   20


Losartan Potassium (LOSARTAN POTASSIUM) 25 Mg Tablet, 25 MG PO DAILY24, TAB


   20


Trazodone Hcl (TRAZODONE HCL) 50 Mg Tablet, 50 MG PO HS, TAB


   20


Gabapentin (GABAPENTIN) 300 Mg Capsule, 300 MG PO TID, CAPSULE


   20


Sitagliptin Phosphate (JANUVIA) 100 Mg Tablet, 100 MG PO DAILY24, TAB


   20


Furosemide (LASIX) 20 Mg Tablet, 10 MG PO DAILY24, TAB


   20


Potassium Chloride (POTASSIUM CHLORIDE) 8 Meq Capsule.er, 8 MEQ PO DAILY24, 

CAP.SR


   20


Buspirone Hcl (BUSPIRONE HCL) 10 Mg Tablet, 10 MG PO DAILY24, TAB


   20


Metformin Hcl (METFORMIN HCL) 1,000 Mg Tablet, 1000 MG PO BID, TAB


   20


Aspirin (ASPIRIN) 81 Mg Tab.chew, 2 TAB PO DAILY, #30 TAB


   16





Past Medical History


Medical History:  congestive heart failure, diabetes, hypertension


Surgical History:  cholecystectomy, , other





Social History


Alcohol Use:  none


Drug Use:  none





Review of Systems


Constitutional:  denies chills, denies fever


EENTM:  nose congestion


Respiratory:  denies cough


Cardiovascular:  denies chest pain


Gastrointestinal:  denies abdominal pain, denies diarrhea, denies vomiting


Genitourinary:  denies pain


Musculoskeletal:  muscle pain; denies muscle stiffness, denies neck pain


Skin:  denies change in color, denies rash


Psychiatric/Neurological:  denies headache


Hematologic/Lymphatic:  denies blood clots





Physical Exam


General Appearance:  No Apparent Distress, WD/WN


EENT:  eyes nml inspection, nml ENT inspection


Neck:  Lymphadenopathy (R), Lymphadenopathy (L)


Respiratory:  chest non-tender, lungs clear


CVS:  reg rate & rhythm


Gastrointestinal:  Non Tender


Back:  Normal Inspection, No CVA Tenderness


Extremities:  Normal Range of Motion, Non-Tender


Neurologic/Psychiatric:  CNs II-XII NML as Tested, No Motor/Sensory Deficits, 

Alert, Normal Mood/Affect, Oriented x 3


Skin:  Other


Comments


r aka well healed, left 3rd and 4th toes distally have what appears to be 

chronic ulceration, no erythema patient still has cap refill to the toes and can

move toes, no pallor or crepitus, the 4th toes has a sterile dressing in place 

that was placed by her home care nurse per patient no midline neck or back pain 

over the spinous processes.





Results/Orders


Results/Orders





Orders - TOÑITO MONTES DE OCA MD


Cbc With Auto Diff (3/22/20 10:23)


Comprehensive Metabolic Panel (3/22/20 10:23)


Creatine Kinase (3/22/20 10:23)


Creatine Kinase Mb (3/22/20 10:23)


Troponin I (3/22/20 10:23)


Ekg-Routine (3/22/20 10:23)


Saline Lock (3/22/20 10:23)


Blood Culture (3/22/20 10:23)


Urinalysis (3/22/20 10:23)


Venous Blood Gas (3/22/20 10:23)


Influenza A&B (3/22/20 10:23)


Acetone,Serum (Ml) (3/22/20 10:23)


Lactic Acid(Ml) (3/22/20 10:23)


Xr Chest 1v (3/22/20 10:23)


Xr Foot Lt (3/22/20 10:23)


0.9 % Sodium Chloride (Ns 1000ml) (3/22/20 10:30)


Insulin Regular, Human (Humulin R) (3/22/20 10:30)


0.9 % Sodium Chloride (Ns 1000ml) (3/22/20 11:00)


Insulin Regular, Human (Humulin R) (3/22/20 12:00)


Urine Culture (3/22/20 12:01)





Vital Signs








  Date Time  Temp Pulse Resp B/P (MAP) Pulse Ox O2 Delivery O2 Flow Rate FiO2


 


3/22/20 12:15 98.0 74 18 121/80 (94) 100 Room Air  


 


3/22/20 11:30 97.8 78 18 129/60 (83) 100 Room Air  


 


3/22/20 10:32 97.8 70 18 148/72 (97) 100 Room Air  


 


3/22/20 10:24 97.8 70 18  100   


 


3/22/20 10:24 97.8 70 18     








Administered Medications








 Medications


  (Trade)  Dose


 Ordered  Sig/Jack


 Route


 PRN Reason  Start Time


 Stop Time Status Last Admin


Dose Admin


 


 Insulin Human


 Regular


  (Humulin R)  10 unit  OT  ONCE


 IV


   3/22/20 10:30


 3/22/20 10:31 UNV 3/22/20 10:40


10 UNIT


 


 Insulin Human


 Regular


  (Humulin R)  10 unit  OT  ONCE


 IV


   3/22/20 12:00


 3/22/20 12:02 DC 3/22/20 12:01


10 UNIT


 


 Sodium Chloride  2,400 ml @ 


 1,200 mls/hr  OT


 IV


   3/22/20 11:00


 20 10:59 UNV 3/22/20 10:46


1,200 MLS/HR








                                Laboratory Tests








Test


 3/22/20


10:35 3/22/20


11:56


 


White Blood Count


 8.7 10^3/uL


(4.5-11.0) 





 


Red Blood Count


 4.76 10^6/uL


(4.00-5.20) 





 


Hemoglobin


 13.4 g/dL


(12.0-15.0) 





 


Hematocrit


 39.6 %


(36.0-46.0) 





 


Mean Corpuscular Volume


 83.2 fL


() 





 


Mean Corpuscular Hemoglobin


 28.2 pg


(26-34) 





 


Mean Corpuscular Hemoglobin


Concent 33.8 g/dL


(33-36.5) 





 


Red Cell Distribution Width


 13.3 %


(11.5-14.5) 





 


Platelet Count


 310 10^3/uL


(150-400) 





 


Mean Platelet Volume


 9.1 fL


(7.8-11.0) 





 


Neutrophils (%) (Auto)


 67.5 %


(41.0-85.0) 





 


Lymphocytes (%) (Auto)


 25.0 %


(24.0-44.0) 





 


Monocytes (%) (Auto)


 5.5 %


(5.0-12.0) 





 


Neutrophils # (Auto)


 5.9 10^3/uL


(1.8-7.7) 





 


Lymphocytes # (Auto)


 2.17 10^3/uL1


(1.0-4.8) 





 


Monocytes # (Auto)


 0.5 10^3/uL


(0.3-0.8) 





 


Absolute Immature Granulocyte


(auto 0.05 10^3 u/L


(0-2) 





 


Absolute Eosinophils (auto)


 0.1 10^3/uL


(0.0-0.2) 





 


Immature Granulocytes %


 0.60 %


(0.00-0.50)  H 





 


Eosinophils %


 0.8 %


(0.0-5.0) 





 


Basophils %


 0.6 %


(0.0-0.2)  H 





 


Basophils #


 0.1 10^3/uL


(0.0-0.1) 





 


POC Blood Gas Site VBG   


 


O2 Saturation 56.6 (70-75)  L 


 


Tone Test N/A   


 


Venous Blood pH


 7.492


(7.32-7.43)  H 





 


Venous Blood pCO2 at Patient


Temp 25.1 MMHG


(41-51)  L 





 


POC Venous pO2


 27.1 MMHG


(30-50)  L 





 


Venous Blood Base Excess -2.7   


 


Venous Blood Temperature 37   


 


Blood Gas Total Hemoglobin


 14.8 %


(12.0-16.0) 





 


Deoxyhemoglobin


 42.6 %


(0.0-5.0)  H 





 


Carboxyhemoglobin


 1.5 %


(0.0-3.9) 





 


Methemoglobin


 0.3 %


(0.00-5.0) 





 


Total Oxygen Concentration


 11.5 %


(13.5-17.5)  L 





 


FiO2 21 % ()   


 


Sodium Level


 134 mmol/L


(132-145) 





 


Potassium Level


 4.5 mmol/L


(3.6-5.2) 





 


Chloride Level


 101.0 mmol/L


() 





 


Carbon Dioxide Level


 21.6 mmol/L


(20.0-32) 





 


Total Carbon Dioxide


 19.6 mmol/L


(23-27)  L 





 


Bicarbonate


 18.8 mmol/L


(24-28)  L 





 


Anion Gap 15.9   


 


Blood Urea Nitrogen


 29 mg/dL


(7-18)  H 





 


Creatinine


 1.26 mg/dL


(0.59-1.40) 





 


Estimated GFR (


American) 51.9 (>/=60)  


 





 


Est GFR (CKD-EPI)(Non-Afr


American) 42.9 (>/=60)  


 





 


BUN/Creatinine Ratio 23.0   


 


Glucose Level


 413 mg/dL


()  *H 





 


Lactic Acid Level


 2.1 mmol/L


(0.5-1.9)  *H 





 


Calcium Level


 9.7 mg/dL


(8.4-10.5) 





 


Total Bilirubin


 0.6 mg/dL


(0.2-1.0) 





 


Aspartate Amino Transferase


(AST) 18 U/L (0-35)  


 





 


Alanine Aminotransferase (ALT)


 19 U/L (12-78)


 





 


Alkaline Phosphatase


 107 U/L


() 





 


Total Creatine Kinase


 85 U/L


() 





 


Creatine Kinase MB


 1.6 ng/mL


(0.5-3.6) 





 


Troponin I


 < 0.02 ng/mL


(0.00-0.05) 





 


Total Protein


 7.7 g/dL


(6.4-8.2) 





 


Albumin


 3.5 g/dL


(3.4-5.0) 





 


Globulin 4.2   


 


Acetone, Semi-Quantitative NEGATIVE   


 


Influenza Type A Antigen


 NEGATIVE (NEG)


 





 


Influenza B Immunofluorescence


 NEGATIVE (NEG)


 





 


Urine Collection Type  UNKNOWN  


 


Urine Color


 


 YELLOW


(YELLOW)


 


Urine Appearance


 


 SLIGHTLY HAZY


(CLEAR)  H


 


Urine Bilirubin


 


 NEGATIVE MG/DL


(NEGATIVE)


 


Urine Ketones


 


 15 mg/dL


(NEGATIVE)  H


 


Urine Specific Gravity


 


 1.005


(1.005-1.035)


 


Urine pH  7 (5.0-6.0)  


 


Urine Protein


 


 NEGATIVE


(NEGATIVE)


 


Urine Urobilinogen


 


 NORMAL


(NEGATIVE)


 


Urine Nitrate


 


 NEGATIVE


(NEGATIVE)


 


Urine Leukocyte Esterase


 


 NEGATIVE


(NEGATIVE)


 


Urine Blood


 


 NEGATIVE


(NEGATIVE)


 


Urine RBC


 


 NONE SEEN


RBC/HPF (NONE


 


Urine WBC


 


 0-2 WBC/HPF


(0-2)


 


Urine Squamous Epithelial


Cells 


 FEW #/HPF


(FEW)


 


Urine Bacteria


 


 NONE SEEN


(NONE SEEN)


 


Urine Yeast  MODERATE  


 


Urine Glucose


 


 1000


(NEGATIVE)  H











Progress


Progress


lactate 2.1 most likely due to hyperglycemia as normal wbc count and no signs of

sirs on vitals or other labs, no definite infection at this time on exam or 

imaging





Departure


Time of Disposition:  12:19


Disposition:  01 HOME, SELF-CARE


Impression:  


   Primary Impression:  


   Hyperglycemia


   Additional Impressions:  


   Muscle cramps


   Diabetic ulcer of foot associated with diabetes mellitus due to underlying 

   condition, limited to breakdown of skin


Condition:  Improved


Patient Instructions:  Hyperglycemia, Muscle Cramps, Pressure Ulcer


Referrals:  


ROMEO AVERY (PCP)


PRIMARY CARE PROVIDER





Additional Instructions:  


return for any worsening symptoms, see your doctor for adjustment of you 

medications, keep a log of you glucose readings and take to your doctor for 

adjustment of your medication.


Duration or Time Spent with Pa:  20





Problem Qualifiers











TOÑITO MONTES DE OCA MD           Mar 22, 2020 10:38

## 2020-03-22 NOTE — NUR
BEDSIDE BS

300

-------------------------------------------------------------------------------

Addendum: 03/22/20 at 1239 by STEPHANIE

-------------------------------------------------------------------------------

BEDSIDE BS

300. REPORTED TO DR MONTES DE OCA.

## 2020-03-22 NOTE — DIREP
PROCEDURE:CHEST 1 VIEW

 

COMPARISON:[facility], CR, XRAY CHEST SINGLE VW, 01/23/2020, 

02:01 PM.

 

INDICATIONS:shoulder pain bilat

 

FINDINGS:

LUNGS/PLEURA:No significant pulmonary parenchymal abnormalities. No effusions.

VASCULATURE:Normal.  Unremarkable pulmonary vasculature.

CARDIAC:Normal.  No cardiac silhouette abnormality or cardiomegaly.  Status 

post CABG

MEDIASTINUM:Normal.  No visible mass or adenopathy. 

BONES:Normal.  No fracture or visible bony lesion.  Sternotomy.  Degenerative 

changes of the shoulders

OTHER:Negative.  

 

CONCLUSION:No acute process, no significant interval change

 

 

 

Dictated by: Oren Nguyen MD on 03/22/2020 at 10:57 AM     

Electronically Signed By: Oren Nguyen MD on 03/22/2020 at 10:59 AM